# Patient Record
Sex: MALE | Race: WHITE | NOT HISPANIC OR LATINO | Employment: FULL TIME | URBAN - METROPOLITAN AREA
[De-identification: names, ages, dates, MRNs, and addresses within clinical notes are randomized per-mention and may not be internally consistent; named-entity substitution may affect disease eponyms.]

---

## 2017-11-02 ENCOUNTER — HOSPITAL ENCOUNTER (EMERGENCY)
Facility: CLINIC | Age: 32
Discharge: HOME | End: 2017-11-02
Attending: EMERGENCY MEDICINE | Admitting: EMERGENCY MEDICINE
Payer: COMMERCIAL

## 2017-11-02 ENCOUNTER — OFFICE VISIT (OUTPATIENT)
Dept: FAMILY MEDICINE | Facility: CLINIC | Age: 32
End: 2017-11-02
Payer: COMMERCIAL

## 2017-11-02 VITALS
SYSTOLIC BLOOD PRESSURE: 130 MMHG | OXYGEN SATURATION: 100 % | RESPIRATION RATE: 16 BRPM | HEART RATE: 80 BPM | TEMPERATURE: 97.3 F | DIASTOLIC BLOOD PRESSURE: 79 MMHG | HEIGHT: 72 IN | BODY MASS INDEX: 24.79 KG/M2 | WEIGHT: 183 LBS

## 2017-11-02 VITALS
TEMPERATURE: 98.6 F | HEIGHT: 72 IN | OXYGEN SATURATION: 99 % | WEIGHT: 183 LBS | BODY MASS INDEX: 24.79 KG/M2 | SYSTOLIC BLOOD PRESSURE: 110 MMHG | DIASTOLIC BLOOD PRESSURE: 72 MMHG | HEART RATE: 69 BPM

## 2017-11-02 DIAGNOSIS — Z23 NEED FOR TETANUS BOOSTER: ICD-10-CM

## 2017-11-02 DIAGNOSIS — L03.113 CELLULITIS OF HAND, RIGHT: ICD-10-CM
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
## 2017-11-02 DIAGNOSIS — S61.451A CAT BITE OF HAND, RIGHT, INITIAL ENCOUNTER: ICD-10-CM

## 2017-11-02 DIAGNOSIS — S61.451A CAT BITE OF HAND, RIGHT, INITIAL ENCOUNTER: Primary | ICD-10-CM
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
## 2017-11-02 PROCEDURE — 99284 EMERGENCY DEPT VISIT MOD MDM: CPT | Mod: 25

## 2017-11-02 PROCEDURE — 90714 TD VACC NO PRESV 7 YRS+ IM: CPT | Performed by: PHYSICIAN ASSISTANT

## 2017-11-02 PROCEDURE — 96365 THER/PROPH/DIAG IV INF INIT: CPT

## 2017-11-02 PROCEDURE — 90471 IMMUNIZATION ADMIN: CPT | Performed by: PHYSICIAN ASSISTANT

## 2017-11-02 PROCEDURE — 99214 OFFICE O/P EST MOD 30 MIN: CPT | Mod: 25 | Performed by: PHYSICIAN ASSISTANT

## 2017-11-02 PROCEDURE — 25000128 H RX IP 250 OP 636: Performed by: EMERGENCY MEDICINE

## 2017-11-02 RX ORDER — AMPICILLIN AND SULBACTAM 2; 1 G/1; G/1
3 INJECTION, POWDER, FOR SOLUTION INTRAMUSCULAR; INTRAVENOUS ONCE
Status: COMPLETED | OUTPATIENT
Start: 2017-11-02 | End: 2017-11-02

## 2017-11-02 RX ADMIN — AMPICILLIN SODIUM AND SULBACTAM SODIUM 3 G: 2; 1 INJECTION, POWDER, FOR SOLUTION INTRAMUSCULAR; INTRAVENOUS at 18:35

## 2017-11-02 ASSESSMENT — ENCOUNTER SYMPTOMS
WOUND: 1
JOINT SWELLING: 0
COLOR CHANGE: 1
FEVER: 0

## 2017-11-02 NOTE — ED PROVIDER NOTES
History     Chief Complaint:  Cat bite    HPI   Lazaro Ingram is a 31 year old male who presents for evaluation of a cat bite. The patient reports that two night ago he was petting a friend's cat when it bit him on his right hand. The area around the bite has since began to redden, which prompted him to seek evaluation. He was seen in clinic for this concern earlier today and was referred here to the ED. The cat that bit him is known to him and immunized for rabies. He notes that he does have a prescription for Augmentin called in, but that he has not picked this up or started taking it yet.    Allergies:  No Known Drug Allergies     Medications:    Augmentin    Past Medical History:    The patient denies any relevant past medical history.    Past Surgical History:    History reviewed. No pertinent past surgical history.    Family History:    The patient denies any relevant family medical history.    Social History:  Smoking Status: Yes  Alcohol Use: Yes  Marital Status:  Single     Review of Systems   Constitutional: Negative for fever.   Musculoskeletal: Negative for joint swelling.   Skin: Positive for color change (Reddened area around the wound) and wound (Cat bite puncute wounds to the right hand).   All other systems reviewed and are negative.    Physical Exam   Vitals:  Patient Vitals for the past 24 hrs:   BP Temp Temp src Pulse Resp SpO2 Height Weight   11/02/17 1744 130/79 97.3  F (36.3  C) Temporal 80 16 100 % 1.829 m (6') 83 kg (183 lb)     Physical Exam  General: Resting comfortably on the gurney  Head:  The scalp, face, and head appear normal  Eyes:  The pupils are normal    Conjunctivae and sclera appear normal  ENT:    The nose is normal    Ears/pinnae are normal  Neck:  Normal range of motion  MS:  Right hand:     There is a cat bite as noted below. There is one puncture on the dorsum into punctures on the volar aspect     The extensor tendon to the fifth finger is normal. The flexor  digitorum profundus and superficialis to the   fourth and fifth fingers are normal. There is no evidence of septic T no cerebritis. There is a small area of cat   bite cellulitis proximal to the dorsal bite as shown in the picture below.   Skin:  Cat bite cellulitis to the right hand as shown below   Neuro: Speech is normal and fluent  Psych:  Awake. Alert.  Normal affect.      Appropriate interactions                    Emergency Department Course     Interventions:  1835 Unasyn, 3 g, IV     Emergency Department Course:  Nursing notes and vitals reviewed.  1808 I had my initial encounter with the patient.  I performed an exam of the patient as documented above.     I discussed the treatment plan with the patient. They expressed understanding of this plan and consented to discharge. They will be discharged home with instructions for care and follow up. In addition, the patient will return to the emergency department if their symptoms persist, worsen, if new symptoms arise or if there is any concern.  All questions were answered.    Impression & Plan      Medical Decision Making:  This patient presents with a cat bite to the hand as noted above. There is 1 puncture wound dorsally and to involving the volar fifth web space. There is a small area of cellulitis dorsally. There is no flexor or extensor septic tenosynovitis.  There is no lymphangitic streaking. There are no systemic signs of infection. This will hopefully resolve with intravenous followed by oral antibiotics. Operative washout is not needed at this time.    Diagnosis:    ICD-10-CM    1. Cat bite, initial encounter W55.01XA    2. Cellulitis of hand, right L03.113    3. Cat bite of hand, right, initial encounter S61.451A DISCONTINUED: amoxicillin-clavulanate (AUGMENTIN) 875-125 MG per tablet    W55.01XA      Disposition:   Discharge    Scribe Disclosure:  Aldo RAM, am serving as a scribe at 6:09 PM on 11/2/2017 to document services personally  performed by Blaine Rios MD, based on my observations and the provider's statements to me.    11/2/2017    EMERGENCY DEPARTMENT       Blaine Rios MD  11/02/17 1029

## 2017-11-02 NOTE — ED AVS SNAPSHOT
Emergency Department    [address] [place] [id number]    Phone:  [phone]    Fax:  [phone]                                       Lazaro Ingram   MRN: [id number]    Department:   Emergency Department   Date of Visit:  11/2/2017           After Visit Summary Signature Page     I have received my discharge instructions, and my questions have been answered. I have discussed any challenges I see with this plan with the nurse or doctor.    ..........................................................................................................................................  Patient/Patient Representative Signature      ..........................................................................................................................................  Patient Representative Print Name and Relationship to Patient    ..................................................               ................................................  Date                                            Time    ..........................................................................................................................................  Reviewed by Signature/Title    ...................................................              ..............................................  Date                                                            Time

## 2017-11-02 NOTE — PROGRESS NOTES
SUBJECTIVE:   Lazaro Ingram is a 31 year old male presenting with a chief complaint of cat bite to his right dominant hand on the small finger.  It is not over the joint  It is on the dorsum and the palmar side.  Onset of symptoms was 2 day(s) ago.  Course of illness is worsening.    Severity mild  Current and Associated symptoms: DENIES fever, chills, sweats and fatigue  He has had redness and swelling of the small finger  Treatment measures tried include None tried.  Predisposing factors include tetanus is unable to be recalled  He states the cat is his friend's and knows that immunizations to included rabies are up to date.    No past medical history on file.  Current Outpatient Prescriptions   Medication Sig Dispense Refill     amoxicillin-clavulanate (AUGMENTIN) 875-125 MG per tablet Take 1 tablet by mouth 2 times daily 20 tablet 0     Social History   Substance Use Topics     Smoking status: Not on file     Smokeless tobacco: Not on file     Alcohol use Not on file       ROS:  Review of systems negative except as stated above.    OBJECTIVE:  /72  Pulse 69  Temp 98.6  F (37  C) (Oral)  Ht 6' (1.829 m)  Wt 183 lb (83 kg)  SpO2 99%  BMI 24.82 kg/m2  GENERAL APPEARANCE: healthy, alert and no distress  EYES: EOMI,  PERRL, conjunctiva clear  NECK: supple, nontender, no lymphadenopathy  RESP: lungs clear to auscultation - no rales, rhonchi or wheezes  CV: regular rates and rhythm, normal S1 S2, no murmur noted  NEURO: Normal strength and tone, sensory exam grossly normal,  normal speech and mentation  SKIN: no suspicious lesions or rashes  Right hand: small finger with puncture wounds consistent with cat bite over the proximal phalanx on dorsum and palmar aspect of small finger.  Erythema, edema of proximal phalanx. Not over MCP or PIP joints.  Extensor tendon with mild pain with passive flexion.  Pain to palpation over palmar tendon.    ASSESSMENT:  Cat bite to small finger of right hand  (2  days old at time of presentation).  Cellulitis of right hand  Tetanus deficiency      PLAN:  Patient was given tetanus  Will treat with oral antibiotic. Script for Augmentin written to be used after ER visit.  Will send to the ER for definitive evaluation and treatment and assessment for ortho consult.  He may need IV antibiotics. I am concerned for tenosynovitis secondary to cat bite.  I called and gave report to [facility] ER.    Cat bite of hand, right, initial encounter  - TD (ADULT, 7+) PRESERVE FREE    Need for tetanus booster  - TD (ADULT, 7+) PRESERVE FREE

## 2017-11-02 NOTE — DISCHARGE INSTRUCTIONS
Cat Bite    A cat bite can cause a wound deep enough to break the skin. In such cases, the wound is cleaned and then sometimes closed. If the wound is closed it is usually not closed completely. This is so that fluid can drain if the wound becomes infected. Often the wound is left open to heal. In addition to wound care, a tetanus shot may be given, if needed.  Home care    Wash your hands well with soap and warm water before and after caring for the wound. This helps lower the risk of infection.    Care for the wound as directed. If a dressing was applied to the wound, be sure to change it as directed.    If the wound bleeds, place a clean, soft cloth on the wound. Then firmly apply pressure until the bleeding stops. This may take up to 5 minutes. Do not release the pressure and look at the wound during this time.    Always get medical attention for cat bites on the hand. They are highly likely to become infected.    Most wounds heal within 10 days. But an infection can occur even with proper treatment. So be sure to check the wound daily for signs of infection (see below).    Antibiotics may be prescribed. These help prevent or treat infection. If you re given antibiotics, take them as directed. Also be sure to complete the medicines.  Rabies prevention  Rabies is a virus that can be carried in certain animals. These can include domestic animals such as cats and dogs. Pets fully vaccinated against rabies (2 shots) are at very low risk of infection. But because human rabies is almost always fatal, any biting pet should be confined for 10 days as an extra precaution. In general, if there is a risk for rabies, the following steps may need to be taken:    If someone s pet cat has bitten you, it should be kept in a secure area for the next 10 days to watch for signs of illness. (If the pet owner won t allow this, contact your local animal control center.) If the cat becomes ill or dies during that time, contact your  local animal control center at once so the animal may be tested for rabies. If the cat stays healthy for the next 10 days, there is no danger of rabies in the animal or you.    If a stray cat bit you, contact your local animal control center. They can give information on capture, quarantine, and animal rabies testing.    If you can t find the animal that bit you in the next 2 days, and if rabies exists in your area, you may need to receive the rabies vaccine series. Call your healthcare provider right away. Or return to the emergency department promptly.    All animal bites should be reported to the local animal control center. If you were not given a form to fill out, you can report this yourself.  Follow-up care  Follow up with your healthcare provider, or as directed.  When to seek medical advice  Call your healthcare provider right away if any of these occur:    Signs of infection:    Spreading redness or warmth from the wound    Increased pain or swelling    Fever of 100.4 F (38 C) or higher, or as directed by your healthcare provider    Colored fluid or pus draining from the wound    Enlarged lymph nodes above the area that was bitten, such as lymph nodes in the armpit if you were bitten on the hand or arm. This may be a sign of cat-scratch disease (cat-scratch fever).    Signs of rabies infection:    Headache    Confusion    Strange behavior    Increased salivating or drooling    Seizure    Decreased ability to move any body part near the bite area    Bleeding that can't be stopped after 5 minutes of firm pressure  Date Last Reviewed: 3/23/2015    6543-0349 [employer]. [address] [place] [address], [place] [id number]. All rights reserved. This information is not intended as a substitute for professional medical care. Always follow your healthcare professional's instructions.          Discharge Instructions for Cellulitis  You have been diagnosed with cellulitis. This is an infection in the deepest  layer of the skin. In some cases, the infection also affects the muscle. Cellulitis is caused by bacteria. The bacteria can enter the body through broken skin. This can happen with a cut, scratch, animal bite, or an insect bite that has been scratched. You may have been treated in the hospital with antibiotics and fluids. You will likely be given a prescription for antibiotics to take at home. This sheet will help you take care of yourself at home.  Home care  When you are home:    Take the prescribed antibiotic medicine you are given as directed until it is gone. Take it even if you feel better. It treats the infection and stops it from returning. Not taking all the medicine can make future infections hard to treat.    Keep the infected area clean.    When possible, raise the infected area above the level of your heart. This helps keep swelling down.    Talk with your healthcare provider if you are in pain. Ask what kind of over-the-counter medicine you can take for pain.    Apply clean bandages as advised.    Take your temperature once a day for a week.    Wash your hands often to prevent spreading the infection.  In the future, wash your hands before and after you touch cuts, scratches, or bandages. This will help prevent infection.   When to call your healthcare provider  Call your healthcare provider immediately if you have any of the following:    Difficulty or pain when moving the joints above or below the infected area    Discharge or pus draining from the area    Fever of 100.4 F (38 C) or higher, or as directed by your healthcare provider    Pain that gets worse in or around the infected     Redness that gets worse in or around the infected area, particularly if the area of redness expands to a wider area    Shaking chills    Swelling of the infected area    Vomiting   Date Last Reviewed: 8/1/2016 2000-2017 [employer]. [address] [place] [address], [place] [address]. All rights reserved. This  information is not intended as a substitute for professional medical care. Always follow your healthcare professional's instructions.      Discharge Instructions  Cellulitis    Cellulitis is an infection of the skin that occurs when bacteria enter the skin.   Symptoms are generally redness, swelling, warmth and pain.  Your infection appeared to be appropriate to treat at home with antibiotics.  However, sometimes your infection may be worse than it seemed at first, or may worsen with time. If you have new or worse symptoms, you may need to be seen again in the Emergency Department or by your primary provider.    Generally, every Emergency Department visit should have a follow-up clinic visit with either a primary or a specialty clinic/provider. Please follow-up as instructed by your emergency provider today.    Return to the Emergency Department if:    The redness, pain, or swelling gets a lot worse.  If the red area was marked, return if it is red significantly beyond the marked area.    You are unable to get your antibiotics, or are vomiting (throwing up) these pills, or you cannot take them.    You are feeling more ill, weak or lightheaded.    You start to run a new fever (temperature >101 F).    Anything else about the infection worries or concerns you.  Treatment:    Start your antibiotics right away, and take them as prescribed. Be sure to finish the whole prescription, even if you are better.    Apply a heating pad, warm packs, or warm water soaks to the infected area for 15 minutes at a time, at least 3 times a day. Do not use a heating pad on your feet or legs if you have diabetes. Do not sleep with a heating pad on, since this can cause burns or skin injury.    Rest your injured area for at least 1-2 days. After that you may start using your extremity again as long as there is not too much pain.     Raise the injured area above the level of your heart as much as possible in the first 1-2 days.    Tylenol   (acetaminophen), Motrin  (ibuprofen), or Advil  (ibuprofen) may help may help reduce pain and fever and may help you feel more comfortable. Be sure to read and follow the package directions, and ask your provider if you have questions.    If you were given a prescription for medicine here today, be sure to read all of the information (including the package insert) that comes with your prescription.  This will include important information about the medicine, its side effects, and any warnings that you need to know about.  The pharmacist who fills the prescription can provide more information and answer questions you may have about the medicine.  If you have questions or concerns that the pharmacist cannot address, please call or return to the Emergency Department.     Remember that you can always come back to the Emergency Department if you are not able to see your regular provider in the amount of time listed above, if you get any new symptoms, or if there is anything that worries you.        Monitor your hand infection very carefully.  If the redness is worsening beyond the line drawn return to the emergency department tomorrow or follow-up with hand surgery.  Sleep with your hand and arm above the level of your heart tonight.

## 2017-11-02 NOTE — MR AVS SNAPSHOT
"              After Visit Summary   2017    Lazaro Ingram    MRN: [id number]           Patient Information     Date Of Birth          1985        Visit Information        Provider Department      2017 4:00 PM Ken Vale PA-C [facility]        Today's Diagnoses     Cat bite of hand, right, initial encounter    -  1    Need for tetanus booster           Follow-ups after your visit        Who to contact     If you have questions or need follow up information about today's clinic visit or your schedule please contact [facility] directly at [phone].  Normal or non-critical lab and imaging results will be communicated to you by [employer], letter or phone within 4 business days after the clinic has received the results. If you do not hear from us within 7 days, please contact the clinic through [employer] or phone. If you have a critical or abnormal lab result, we will notify you by phone as soon as possible.  Submit refill requests through [employer] or call your pharmacy and they will forward the refill request to us. Please allow 3 business days for your refill to be completed.          Additional Information About Your Visit        MyChart Information     [employer] lets you send messages to your doctor, view your test results, renew your prescriptions, schedule appointments and more. To sign up, go to [email or web address] . Click on \"Log in\" on the left side of the screen, which will take you to the Welcome page. Then click on \"Sign up Now\" on the right side of the page.     You will be asked to enter the access code listed below, as well as some personal information. Please follow the directions to create your username and password.     Your access code is: [email or web address]  Expires: 2018  7:40 PM     Your access code will  in 90 days. If you need help or a new code, please call your [facility] or [phone].        Care EveryWhere ID  "    This is your Care EveryWhere ID. This could be used by other organizations to access your [facility] medical records  [id number]        Your Vitals Were     Pulse Temperature Height Pulse Oximetry BMI (Body Mass Index)       69 98.6  F (37  C) (Oral) 6' (1.829 m) 99% 24.82 kg/m2        Blood Pressure from Last 3 Encounters:   11/02/17 130/79   11/02/17 110/72    Weight from Last 3 Encounters:   11/02/17 183 lb (83 kg)   11/02/17 183 lb (83 kg)              We Performed the Following     TD (ADULT, 7+) PRESERVE FREE        Primary Care Provider    Physician No Ref-Primary       NO REF-PRIMARY PHYSICIAN        Equal Access to Services     JAYESH LIVINGSTON : Fred Medina, benedicto torres, cathleen bonilla, chanel ortiz . So [facility] [phone].    ATENCIÓN: Si habla español, tiene a wills disposición servicios gratuitos de asistencia lingüística. Llame al [phone].    We comply with applicable federal civil rights laws and [place] laws. We do not discriminate on the basis of race, color, national origin, age, disability, sex, sexual orientation, or gender identity.            Thank you!     Thank you for choosing [facility]  for your care. Our goal is always to provide you with excellent care. Hearing back from our patients is one way we can continue to improve our services. Please take a few minutes to complete the written survey that you may receive in the mail after your visit with us. Thank you!             Your Updated Medication List - Protect others around you: Learn how to safely use, store and throw away your medicines at www.disposemymeds.org.      Notice  As of 11/2/2017  9:01 PM    You have not been prescribed any medications.

## 2017-11-02 NOTE — ED AVS SNAPSHOT
Emergency Department    [address] [facility] [address]    Phone:  [phone]    Fax:  [phone]                                       Lazaro Ingram   MRN: [id number]    Department:   Emergency Department   Date of Visit:  11/2/2017           Patient Information     Date Of Birth          1985        Your diagnoses for this visit were:     Cat bite, initial encounter     Cellulitis of hand, right     Cat bite of hand, right, initial encounter        You were seen by Blaine Rios MD.      Follow-up Information     Follow up with Matt Salinas MD.    Specialty:  Orthopedics    Why:  As needed, If symptoms worsen    Contact information:    [facility]  [address] [id number] [phone]          Follow up with  Emergency Department.    Specialty:  EMERGENCY MEDICINE    Why:  As needed, If symptoms worsen    Contact information:    [address] [facility] [address] [phone]        Discharge Instructions         Cat Bite    A cat bite can cause a wound deep enough to break the skin. In such cases, the wound is cleaned and then sometimes closed. If the wound is closed it is usually not closed completely. This is so that fluid can drain if the wound becomes infected. Often the wound is left open to heal. In addition to wound care, a tetanus shot may be given, if needed.  Home care    Wash your hands well with soap and warm water before and after caring for the wound. This helps lower the risk of infection.    Care for the wound as directed. If a dressing was applied to the wound, be sure to change it as directed.    If the wound bleeds, place a clean, soft cloth on the wound. Then firmly apply pressure until the bleeding stops. This may take up to 5 minutes. Do not release the pressure and look at the wound during this time.    Always get medical attention for cat bites on the hand. They are highly likely to become  infected.    Most wounds heal within 10 days. But an infection can occur even with proper treatment. So be sure to check the wound daily for signs of infection (see below).    Antibiotics may be prescribed. These help prevent or treat infection. If you re given antibiotics, take them as directed. Also be sure to complete the medicines.  Rabies prevention  Rabies is a virus that can be carried in certain animals. These can include domestic animals such as cats and dogs. Pets fully vaccinated against rabies (2 shots) are at very low risk of infection. But because human rabies is almost always fatal, any biting pet should be confined for 10 days as an extra precaution. In general, if there is a risk for rabies, the following steps may need to be taken:    If someone s pet cat has bitten you, it should be kept in a secure area for the next 10 days to watch for signs of illness. (If the pet owner won t allow this, contact your local animal control center.) If the cat becomes ill or dies during that time, contact your local animal control center at once so the animal may be tested for rabies. If the cat stays healthy for the next 10 days, there is no danger of rabies in the animal or you.    If a stray cat bit you, contact your local animal control center. They can give information on capture, quarantine, and animal rabies testing.    If you can t find the animal that bit you in the next 2 days, and if rabies exists in your area, you may need to receive the rabies vaccine series. Call your healthcare provider right away. Or return to the emergency department promptly.    All animal bites should be reported to the local animal control center. If you were not given a form to fill out, you can report this yourself.  Follow-up care  Follow up with your healthcare provider, or as directed.  When to seek medical advice  Call your healthcare provider right away if any of these occur:    Signs of infection:    Spreading redness  or warmth from the wound    Increased pain or swelling    Fever of 100.4 F (38 C) or higher, or as directed by your healthcare provider    Colored fluid or pus draining from the wound    Enlarged lymph nodes above the area that was bitten, such as lymph nodes in the armpit if you were bitten on the hand or arm. This may be a sign of cat-scratch disease (cat-scratch fever).    Signs of rabies infection:    Headache    Confusion    Strange behavior    Increased salivating or drooling    Seizure    Decreased ability to move any body part near the bite area    Bleeding that can't be stopped after 5 minutes of firm pressure  Date Last Reviewed: 3/23/2015    1560-9539 [employer]. [address] [place] [address], [place] [address]. All rights reserved. This information is not intended as a substitute for professional medical care. Always follow your healthcare professional's instructions.          Discharge Instructions for Cellulitis  You have been diagnosed with cellulitis. This is an infection in the deepest layer of the skin. In some cases, the infection also affects the muscle. Cellulitis is caused by bacteria. The bacteria can enter the body through broken skin. This can happen with a cut, scratch, animal bite, or an insect bite that has been scratched. You may have been treated in the hospital with antibiotics and fluids. You will likely be given a prescription for antibiotics to take at home. This sheet will help you take care of yourself at home.  Home care  When you are home:    Take the prescribed antibiotic medicine you are given as directed until it is gone. Take it even if you feel better. It treats the infection and stops it from returning. Not taking all the medicine can make future infections hard to treat.    Keep the infected area clean.    When possible, raise the infected area above the level of your heart. This helps keep swelling down.    Talk with your healthcare provider if you are in pain.  Ask what kind of over-the-counter medicine you can take for pain.    Apply clean bandages as advised.    Take your temperature once a day for a week.    Wash your hands often to prevent spreading the infection.  In the future, wash your hands before and after you touch cuts, scratches, or bandages. This will help prevent infection.   When to call your healthcare provider  Call your healthcare provider immediately if you have any of the following:    Difficulty or pain when moving the joints above or below the infected area    Discharge or pus draining from the area    Fever of 100.4 F (38 C) or higher, or as directed by your healthcare provider    Pain that gets worse in or around the infected     Redness that gets worse in or around the infected area, particularly if the area of redness expands to a wider area    Shaking chills    Swelling of the infected area    Vomiting   Date Last Reviewed: 8/1/2016 2000-2017 [employer]. [address] [place] [address]. All rights reserved. This information is not intended as a substitute for professional medical care. Always follow your healthcare professional's instructions.      Discharge Instructions  Cellulitis    Cellulitis is an infection of the skin that occurs when bacteria enter the skin.   Symptoms are generally redness, swelling, warmth and pain.  Your infection appeared to be appropriate to treat at home with antibiotics.  However, sometimes your infection may be worse than it seemed at first, or may worsen with time. If you have new or worse symptoms, you may need to be seen again in the Emergency Department or by your primary provider.    Generally, every Emergency Department visit should have a follow-up clinic visit with either a primary or a specialty clinic/provider. Please follow-up as instructed by your emergency provider today.    Return to the Emergency Department if:    The redness, pain, or swelling gets a lot worse.  If the  red area was marked, return if it is red significantly beyond the marked area.    You are unable to get your antibiotics, or are vomiting (throwing up) these pills, or you cannot take them.    You are feeling more ill, weak or lightheaded.    You start to run a new fever (temperature >101 F).    Anything else about the infection worries or concerns you.  Treatment:    Start your antibiotics right away, and take them as prescribed. Be sure to finish the whole prescription, even if you are better.    Apply a heating pad, warm packs, or warm water soaks to the infected area for 15 minutes at a time, at least 3 times a day. Do not use a heating pad on your feet or legs if you have diabetes. Do not sleep with a heating pad on, since this can cause burns or skin injury.    Rest your injured area for at least 1-2 days. After that you may start using your extremity again as long as there is not too much pain.     Raise the injured area above the level of your heart as much as possible in the first 1-2 days.    Tylenol  (acetaminophen), Motrin  (ibuprofen), or Advil  (ibuprofen) may help may help reduce pain and fever and may help you feel more comfortable. Be sure to read and follow the package directions, and ask your provider if you have questions.    If you were given a prescription for medicine here today, be sure to read all of the information (including the package insert) that comes with your prescription.  This will include important information about the medicine, its side effects, and any warnings that you need to know about.  The pharmacist who fills the prescription can provide more information and answer questions you may have about the medicine.  If you have questions or concerns that the pharmacist cannot address, please call or return to the Emergency Department.     Remember that you can always come back to the Emergency Department if you are not able to see your regular provider in the amount of time  listed above, if you get any new symptoms, or if there is anything that worries you.        Monitor your hand infection very carefully.  If the redness is worsening beyond the line drawn return to the emergency department tomorrow or follow-up with hand surgery.  Sleep with your hand and arm above the level of your heart tonight.            24 Hour Appointment Hotline       To make an appointment at any [facility], call [phone] ([phone]). If you don't have a family doctor or clinic, we will help you find one. [facility] clinics are conveniently located to serve the needs of you and your family.             Review of your medicines      STOP taking        Dose Reason for stopping Comments    amoxicillin-clavulanate 875-125 MG per tablet   Commonly known as:  AUGMENTIN                      Orders Needing Specimen Collection     None      Pending Results     No orders found from 10/31/2017 to 11/3/2017.            Pending Culture Results     No orders found from 10/31/2017 to 11/3/2017.            Pending Results Instructions     If you had any lab results that were not finalized at the time of your Discharge, you can call the ED Lab Result RN at [phone]. You will be contacted by this team for any positive Lab results or changes in treatment. The nurses are available 7 days a week from 10A to 6:30P.  You can leave a message 24 hours per day and they will return your call.        Test Results From Your Hospital Stay               Clinical Quality Measure: Blood Pressure Screening     Your blood pressure was checked while you were in the emergency department today. The last reading we obtained was  BP: 130/79 . Please read the guidelines below about what these numbers mean and what you should do about them.  If your systolic blood pressure (the top number) is less than 120 and your diastolic blood pressure (the bottom number) is less than 80, then your blood pressure is normal. There is nothing more  "that you need to do about it.  If your systolic blood pressure (the top number) is 120-139 or your diastolic blood pressure (the bottom number) is 80-89, your blood pressure may be higher than it should be. You should have your blood pressure rechecked within a year by a primary care provider.  If your systolic blood pressure (the top number) is 140 or greater or your diastolic blood pressure (the bottom number) is 90 or greater, you may have high blood pressure. High blood pressure is treatable, but if left untreated over time it can put you at risk for heart attack, stroke, or kidney failure. You should have your blood pressure rechecked by a primary care provider within the next 4 weeks.  If your provider in the emergency department today gave you specific instructions to follow-up with your doctor or provider even sooner than that, you should follow that instruction and not wait for up to 4 weeks for your follow-up visit.        Thank you for choosing [facility]       Thank you for choosing [facility] for your care. Our goal is always to provide you with excellent care. Hearing back from our patients is one way we can continue to improve our services. Please take a few minutes to complete the written survey that you may receive in the mail after you visit with us. Thank you!        [employer] Information     [employer] lets you send messages to your doctor, view your test results, renew your prescriptions, schedule appointments and more. To sign up, go to [email or web address] . Click on \"Log in\" on the left side of the screen, which will take you to the Welcome page. Then click on \"Sign up Now\" on the right side of the page.     You will be asked to enter the access code listed below, as well as some personal information. Please follow the directions to create your username and password.     Your access code is: [email or web address]  Expires: 2018  7:40 PM     Your access code will  in 90 days. If you need help or a " new code, please call your [place] clinic or [phone].        Care EveryWhere ID     This is your Care EveryWhere ID. This could be used by other organizations to access your [place] medical records  [id number]        Equal Access to Services     JAYESH LIVINGSTON : Fred sorensen Solaney, wagrahamda luqadaha, qaybta kaalmada irene, chanel multani. So [facility] [phone].    ATENCIÓN: Si habla español, tiene a wills disposición servicios gratuitos de asistencia lingüística. Llame al [phone].    We comply with applicable federal civil rights laws and [place] laws. We do not discriminate on the basis of race, color, national origin, age, disability, sex, sexual orientation, or gender identity.            After Visit Summary       This is your record. Keep this with you and show to your community pharmacist(s) and doctor(s) at your next visit.

## 2017-11-02 NOTE — NURSING NOTE
Chief Complaint   Patient presents with     Trauma     Pt in clinic to have eval for right hand cat bite.       Initial /72  Pulse 69  Temp 98.6  F (37  C) (Oral)  Ht 1.829 m (6')  Wt 83 kg (183 lb)  SpO2 99%  BMI 24.82 kg/m2 Estimated body mass index is 24.82 kg/(m^2) as calculated from the following:    Height as of this encounter: 1.829 m (6').    Weight as of this encounter: 83 kg (183 lb).  Medication Reconciliation: complete   Katelyn Thomas/ MA

## 2019-11-04 ENCOUNTER — OFFICE VISIT (OUTPATIENT)
Dept: FAMILY MEDICINE | Facility: CLINIC | Age: 34
End: 2019-11-04
Payer: COMMERCIAL

## 2019-11-04 VITALS
TEMPERATURE: 98.1 F | DIASTOLIC BLOOD PRESSURE: 70 MMHG | BODY MASS INDEX: 24.08 KG/M2 | WEIGHT: 172 LBS | RESPIRATION RATE: 18 BRPM | HEIGHT: 71 IN | SYSTOLIC BLOOD PRESSURE: 104 MMHG | OXYGEN SATURATION: 97 % | HEART RATE: 84 BPM

## 2019-11-04 DIAGNOSIS — R07.89 XYPHOIDALGIA: ICD-10-CM

## 2019-11-04 DIAGNOSIS — F32.A ANXIETY AND DEPRESSION: Primary | ICD-10-CM

## 2019-11-04 DIAGNOSIS — L30.9 ECZEMA, UNSPECIFIED TYPE: ICD-10-CM

## 2019-11-04 DIAGNOSIS — F41.9 ANXIETY AND DEPRESSION: Primary | ICD-10-CM

## 2019-11-04 DIAGNOSIS — K62.9 RECTAL LESION: ICD-10-CM

## 2019-11-04 PROCEDURE — 99214 OFFICE O/P EST MOD 30 MIN: CPT | Performed by: PHYSICIAN ASSISTANT

## 2019-11-04 RX ORDER — TRIAMCINOLONE ACETONIDE 1 MG/G
CREAM TOPICAL 2 TIMES DAILY
Qty: 45 G | Refills: 1 | Status: SHIPPED | OUTPATIENT
Start: 2019-11-04 | End: 2021-11-18

## 2019-11-04 ASSESSMENT — ANXIETY QUESTIONNAIRES
IF YOU CHECKED OFF ANY PROBLEMS ON THIS QUESTIONNAIRE, HOW DIFFICULT HAVE THESE PROBLEMS MADE IT FOR YOU TO DO YOUR WORK, TAKE CARE OF THINGS AT HOME, OR GET ALONG WITH OTHER PEOPLE: EXTREMELY DIFFICULT
3. WORRYING TOO MUCH ABOUT DIFFERENT THINGS: NEARLY EVERY DAY
5. BEING SO RESTLESS THAT IT IS HARD TO SIT STILL: MORE THAN HALF THE DAYS
2. NOT BEING ABLE TO STOP OR CONTROL WORRYING: NEARLY EVERY DAY
1. FEELING NERVOUS, ANXIOUS, OR ON EDGE: NEARLY EVERY DAY
7. FEELING AFRAID AS IF SOMETHING AWFUL MIGHT HAPPEN: SEVERAL DAYS
6. BECOMING EASILY ANNOYED OR IRRITABLE: NEARLY EVERY DAY
GAD7 TOTAL SCORE: 18

## 2019-11-04 ASSESSMENT — PATIENT HEALTH QUESTIONNAIRE - PHQ9
SUM OF ALL RESPONSES TO PHQ QUESTIONS 1-9: 23
5. POOR APPETITE OR OVEREATING: NEARLY EVERY DAY

## 2019-11-04 ASSESSMENT — MIFFLIN-ST. JEOR: SCORE: 1756.28

## 2019-11-04 NOTE — PATIENT INSTRUCTIONS
Patient Education     Constipation (Adult)  Constipation means that you have bowel movements that are less frequent than usual. Stools often become very hard and difficult to pass.  Constipation is very common. At some point in life, it affects almost everyone. Since everyone's bowel habits are different, what is constipation to one person may not be to another. Your healthcare provider may do tests to diagnose constipation. It depends on what he or she finds when evaluating you.    Symptoms of constipation include:    Abdominal pain    Bloating    Vomiting    Painful bowel movements    Itching, swelling, bleeding, or pain around the anus  Causes  Constipation can have many causes. These include:    Diet low in fiber    Too much dairy    Not drinking enough liquids    Lack of exercise or physical activity (especially true for older adults)    Changes in lifestyle or daily routine, including pregnancy, aging, work, and travel    Frequent use or misuse of laxatives    Ignoring the urge to have a bowel movement or delaying it until later    Medicines, such as certain prescription pain medicines, iron supplements, antacids, certain antidepressants, and calcium supplements    Diseases like irritable bowel syndrome, bowel obstructions, stroke, diabetes, thyroid disease, Parkinson disease, hemorrhoids, and colon cancer  Complications  Potential complications of constipation can include:    Hemorrhoids    Rectal bleeding from hemorrhoids or anal fissures (skin tears)    Hernias    Dependency on laxatives    Chronic constipation    Fecal impaction, a severe form of constipation in which a large amount of hard stool is in your rectum that you can't pass    Bowel obstruction or perforation  Home care  All treatment should be done after talking with your healthcare provider. This is especially true if you have another medical problems, are taking prescription medicines, or are an older adult. Treatment most often involves  lifestyle changes. You may also need medicines. Your healthcare provider will tell you which will work best for you. Follow the advice below to help avoid this problem in the future.  Lifestyle changes  These lifestyle changes can help prevent constipation:    Diet. Eat a high-fiber diet, with fresh fruit and vegetables, and reduce dairy intake, meats, and processed foods    Fluids. It's important to get enough fluids each day. Drink plenty of water when you eat more fiber. If you are on diet that limits the amount of fluid you can have, talk about this with your healthcare provider.    Regular exercise. Check with your healthcare provider first.  Medicines  Take any medicines as directed. Some laxatives are safe to use only every now and then. Others can be taken on a regular basis. While laxatives don't cause bowel dependence, they are treating the symptoms. So your constipation may return if you don't make other changes. Talk with your healthcare provider or pharmacist if you have questions.  Prescription pain medicines can cause constipation. If you are taking this kind of medicine, ask your healthcare provider if you should also take a stool softener.  Medicines you may take to treat constipation include:    Fiber supplements    Stool softeners    Laxatives    Enemas    Rectal suppositories  Follow-up care  Follow up with your healthcare provider if symptoms don't get better in the next few days. You may need to have more tests or see a specialist.  Call 911  Call 911 if any of these occur:    Trouble breathing    Stiff, rigid abdomen that is severely painful to touch    Confusion    Fainting or loss of consciousness    Rapid heart rate    Chest pain  When to seek medical advice  Call your healthcare provider right away if any of these occur:    Fever of 100.4 F (38 C) or higher, or as directed by your healthcare provider    Failure to resume normal bowel movements    Pain in your abdomen or back gets  worse    Nausea or vomiting    Swelling in your abdomen    Blood in the stool    Black, tarry stool    Involuntary weight loss    Weakness  Date Last Reviewed: 6/1/2018 2000-2018 [employer] [place]. [address] [place] [address], [place] [address]. All rights reserved. This information is not intended as a substitute for professional medical care. Always follow your healthcare professional's instructions.

## 2019-11-04 NOTE — PROGRESS NOTES
Subjective     Lazaro Ingram is a 33 year old male who presents to clinic today for the following health issues:    HPI     33 year old male presenting to establish care and discuss recent mental health issues. He reports approximately 1 week ago he had a mental breakdown at work. 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 Reports stress level has been elevated, and he lost his cool/had an outburst in the back of the store, not in public. He is currently on leave from work and unable to return until seen by a provider. Overall he feels like work has been supportive. They are helping him find counseling services. He reports he has been dealing with increased stress for the past 1.5 years. His girlfriend of 6 years left him abruptly which was a major stressor. Leading up to his breakdown at work he had been feeling increasingly depressed, not wanting to get out of bed to go to work in the morning. He feels anxious. He notes a lack of mindfulness, not feeling present, not focusing or remembering things like he should. His sleep has been inconsistent, some nights he will sleep for 3 hours other nights for 10 hours. For the last year he has tried to increase activity level but reports he has not been motivated lately. He generally enjoys working out outside. In winter has gone to the gym in the past. He denies SI.    He admits a history of depression when younger approximately 16 years of age. He tried medication but did not like the way they made him feel. Does not recall name of medication(s).     He also has some additional concerns today. He has noticed a prominence over the middle of his rib cage. He notices tenderness to the area sometimes, primarily when he bends forward. He would like this evaluated today.    He has also noticed there is a small tear near his rectum that he reports is due to poor dietary habits. He noticed some drainage from the area a few days ago and now it is  "improved. Would like it checked out to ensure it is not infected. It is still mildly tender if he presses on the area.     He also has a history of eczema. With recent increase in stress he has noticed a flare of symptoms to his legs. He has been scratching the lesions. Would like a medication to help with this today.         There is no problem list on file for this patient.    History reviewed. No pertinent surgical history.    Social History     Tobacco Use     Smoking status: Former Smoker     Packs/day: 0.50     Smokeless tobacco: Current User   Substance Use Topics     Alcohol use: Yes     History reviewed. No pertinent family history.      No current outpatient medications on file.     No Known Allergies    Reviewed and updated as needed this visit by Provider         Review of Systems   ROS COMP: Constitutional, HEENT, cardiovascular, pulmonary, gi and gu systems are negative, except as otherwise noted.      Objective    /70 (BP Location: Left arm, Patient Position: Sitting, Cuff Size: Adult Regular)   Pulse 84   Temp 98.1  F (36.7  C) (Oral)   Resp 18   Ht 1.81 m (5' 11.25\")   Wt 78 kg (172 lb)   SpO2 97%   BMI 23.82 kg/m    Body mass index is 23.82 kg/m .  Physical Exam  Vitals signs and nursing note reviewed.   Constitutional:       Appearance: Normal appearance. He is normal weight.   HENT:      Head: Normocephalic and atraumatic.      Right Ear: Tympanic membrane, ear canal and external ear normal.      Left Ear: Tympanic membrane, ear canal and external ear normal.      Mouth/Throat:      Mouth: Mucous membranes are moist.      Pharynx: No posterior oropharyngeal erythema.   Eyes:      Extraocular Movements: Extraocular movements intact.      Conjunctiva/sclera: Conjunctivae normal.   Cardiovascular:      Rate and Rhythm: Normal rate and regular rhythm.      Heart sounds: Normal heart sounds.   Pulmonary:      Effort: Pulmonary effort is normal.      Breath sounds: Normal breath sounds. "   Chest:      Comments: Prominent xyphoid, no chest wall tenderness   Abdominal:      General: Abdomen is flat. Bowel sounds are normal. There is no distension.      Palpations: Abdomen is soft.      Tenderness: There is no tenderness. There is no right CVA tenderness, left CVA tenderness, guarding or rebound.   Genitourinary:     Rectum: No tenderness, anal fissure or external hemorrhoid.      Comments: There is a small pinpoint area of tenderness over the 3 o'clock position, no obvious hemorrhoid. No redness or swelling. No induration.   Musculoskeletal: Normal range of motion.   Skin:     General: Skin is warm and dry.      Comments: There are erythematous scaly lesions to bilateral legs with evidence of excoriations    Neurological:      General: No focal deficit present.      Mental Status: He is alert and oriented to person, place, and time.   Psychiatric:         Mood and Affect: Mood normal.         Behavior: Behavior normal.          Diagnostic Test Results:  Labs reviewed in Epic  No results found for any visits on 11/04/19.        Assessment & Plan     1. Anxiety and depression  - Discussed starting patient on daily anxiety/depression medication but he is not interested at this time  - Patient thinks he will obtain counseling services through work but was open to [place] referral today in the event he cannot find a therapist   - Recommended patient focus on diet/exercise  - Discussed biofeedback applications for stress reduction  - MENTAL HEALTH REFERRAL  - Adult; Outpatient Treatment; Individual/Couples/Family/Group Therapy/Health Psychology; [facility] [place] [facility] [phone]; We will contact you to schedule the appointment or please call with any questions    2. Rectal lesion  - Pinpoint lesion on exam today, minimally tender  - Encouraged patient to drink plenty of water, exercise, eat fruits/vegetables, avoid straining on toilet for extended periods of time   - Handout provided on  constipation    3. Eczema, unspecified type  - triamcinolone (KENALOG) 0.1 % external cream; Apply topically 2 times daily  Dispense: 45 g; Refill: 1    4. Xyphoidalgia  - PRN tylenol/ibuprofen, ice  - Return to care if new or worsening symptoms            Patient Instructions     Patient Education     Constipation (Adult)  Constipation means that you have bowel movements that are less frequent than usual. Stools often become very hard and difficult to pass.  Constipation is very common. At some point in life, it affects almost everyone. Since everyone's bowel habits are different, what is constipation to one person may not be to another. Your healthcare provider may do tests to diagnose constipation. It depends on what he or she finds when evaluating you.    Symptoms of constipation include:    Abdominal pain    Bloating    Vomiting    Painful bowel movements    Itching, swelling, bleeding, or pain around the anus  Causes  Constipation can have many causes. These include:    Diet low in fiber    Too much dairy    Not drinking enough liquids    Lack of exercise or physical activity (especially true for older adults)    Changes in lifestyle or daily routine, including pregnancy, aging, work, and travel    Frequent use or misuse of laxatives    Ignoring the urge to have a bowel movement or delaying it until later    Medicines, such as certain prescription pain medicines, iron supplements, antacids, certain antidepressants, and calcium supplements    Diseases like irritable bowel syndrome, bowel obstructions, stroke, diabetes, thyroid disease, Parkinson disease, hemorrhoids, and colon cancer  Complications  Potential complications of constipation can include:    Hemorrhoids    Rectal bleeding from hemorrhoids or anal fissures (skin tears)    Hernias    Dependency on laxatives    Chronic constipation    Fecal impaction, a severe form of constipation in which a large amount of hard stool is in your rectum that you can't  pass    Bowel obstruction or perforation  Home care  All treatment should be done after talking with your healthcare provider. This is especially true if you have another medical problems, are taking prescription medicines, or are an older adult. Treatment most often involves lifestyle changes. You may also need medicines. Your healthcare provider will tell you which will work best for you. Follow the advice below to help avoid this problem in the future.  Lifestyle changes  These lifestyle changes can help prevent constipation:    Diet. Eat a high-fiber diet, with fresh fruit and vegetables, and reduce dairy intake, meats, and processed foods    Fluids. It's important to get enough fluids each day. Drink plenty of water when you eat more fiber. If you are on diet that limits the amount of fluid you can have, talk about this with your healthcare provider.    Regular exercise. Check with your healthcare provider first.  Medicines  Take any medicines as directed. Some laxatives are safe to use only every now and then. Others can be taken on a regular basis. While laxatives don't cause bowel dependence, they are treating the symptoms. So your constipation may return if you don't make other changes. Talk with your healthcare provider or pharmacist if you have questions.  Prescription pain medicines can cause constipation. If you are taking this kind of medicine, ask your healthcare provider if you should also take a stool softener.  Medicines you may take to treat constipation include:    Fiber supplements    Stool softeners    Laxatives    Enemas    Rectal suppositories  Follow-up care  Follow up with your healthcare provider if symptoms don't get better in the next few days. You may need to have more tests or see a specialist.  Call 911  Call 911 if any of these occur:    Trouble breathing    Stiff, rigid abdomen that is severely painful to touch    Confusion    Fainting or loss of consciousness    Rapid heart  rate    Chest pain  When to seek medical advice  Call your healthcare provider right away if any of these occur:    Fever of 100.4 F (38 C) or higher, or as directed by your healthcare provider    Failure to resume normal bowel movements    Pain in your abdomen or back gets worse    Nausea or vomiting    Swelling in your abdomen    Blood in the stool    Black, tarry stool    Involuntary weight loss    Weakness  Date Last Reviewed: 6/1/2018 2000-2018 [employer]. [address] [place] [address], [place] [address]. All rights reserved. This information is not intended as a substitute for professional medical care. Always follow your healthcare professional's instructions.               Return in about 3 months (around 2/4/2020) for Routine Visit.    Johnny Parsons PA-C  [facility]

## 2019-11-05 ASSESSMENT — ANXIETY QUESTIONNAIRES: GAD7 TOTAL SCORE: 18

## 2019-11-13 ENCOUNTER — OFFICE VISIT (OUTPATIENT)
Dept: PSYCHOLOGY | Facility: CLINIC | Age: 34
End: 2019-11-13
Attending: PHYSICIAN ASSISTANT
Payer: COMMERCIAL

## 2019-11-13 ENCOUNTER — FCC EXTENDED DOCUMENTATION (OUTPATIENT)
Dept: PSYCHOLOGY | Facility: CLINIC | Age: 34
End: 2019-11-13

## 2019-11-13 DIAGNOSIS — F41.1 GENERALIZED ANXIETY DISORDER: ICD-10-CM

## 2019-11-13 DIAGNOSIS — F33.1 MODERATE RECURRENT MAJOR DEPRESSION (H): Primary | ICD-10-CM

## 2019-11-13 PROCEDURE — 90834 PSYTX W PT 45 MINUTES: CPT | Performed by: SOCIAL WORKER

## 2019-11-13 ASSESSMENT — COLUMBIA-SUICIDE SEVERITY RATING SCALE - C-SSRS
6. HAVE YOU EVER DONE ANYTHING, STARTED TO DO ANYTHING, OR PREPARED TO DO ANYTHING TO END YOUR LIFE?: NO
1. IN YOUR LIFETIME, HAVE YOU WISHED YOU WERE DEAD OR WISHED YOU COULD GO TO SLEEP AND NOT WAKE UP?: SEE BELOW
2. HAVE YOU ACTUALLY HAD ANY THOUGHTS OF KILLING YOURSELF?: NO
TOTAL  NUMBER OF INTERRUPTED ATTEMPTS PAST 3 MONTHS: NO
1. IN THE PAST MONTH, HAVE YOU WISHED YOU WERE DEAD OR WISHED YOU COULD GO TO SLEEP AND NOT WAKE UP?: YES
4. HAVE YOU HAD THESE THOUGHTS AND HAD SOME INTENTION OF ACTING ON THEM?: NO
REASONS FOR IDEATION LIFETIME: MOSTLY TO END OR STOP THE PAIN (YOU COULDN'T GO ON LIVING WITH THE PAIN OR HOW YOU WERE FEELING)
ATTEMPT PAST THREE MONTHS: NO
TOTAL  NUMBER OF ABORTED OR SELF INTERRUPTED ATTEMPTS PAST LIFETIME: NO
3. HAVE YOU BEEN THINKING ABOUT HOW YOU MIGHT KILL YOURSELF?: YES
5. HAVE YOU STARTED TO WORK OUT OR WORKED OUT THE DETAILS OF HOW TO KILL YOURSELF? DO YOU INTEND TO CARRY OUT THIS PLAN?: NO
ATTEMPT LIFETIME: NO
2. HAVE YOU ACTUALLY HAD ANY THOUGHTS OF KILLING YOURSELF?: SEE BELOW
2. HAVE YOU ACTUALLY HAD ANY THOUGHTS OF KILLING YOURSELF LIFETIME?: YES
5. HAVE YOU STARTED TO WORK OUT OR WORKED OUT THE DETAILS OF HOW TO KILL YOURSELF? DO YOU INTEND TO CARRY OUT THIS PLAN?: NO
TOTAL  NUMBER OF INTERRUPTED ATTEMPTS LIFETIME: NO
TOTAL  NUMBER OF ABORTED OR SELF INTERRUPTED ATTEMPTS PAST 3 MONTHS: NO
6. HAVE YOU EVER DONE ANYTHING, STARTED TO DO ANYTHING, OR PREPARED TO DO ANYTHING TO END YOUR LIFE?: NO
4. HAVE YOU HAD THESE THOUGHTS AND HAD SOME INTENTION OF ACTING ON THEM?: NO
1. IN THE PAST MONTH, HAVE YOU WISHED YOU WERE DEAD OR WISHED YOU COULD GO TO SLEEP AND NOT WAKE UP?: NO

## 2019-11-13 ASSESSMENT — ANXIETY QUESTIONNAIRES
6. BECOMING EASILY ANNOYED OR IRRITABLE: NEARLY EVERY DAY
5. BEING SO RESTLESS THAT IT IS HARD TO SIT STILL: MORE THAN HALF THE DAYS
3. WORRYING TOO MUCH ABOUT DIFFERENT THINGS: NEARLY EVERY DAY
GAD7 TOTAL SCORE: 19
2. NOT BEING ABLE TO STOP OR CONTROL WORRYING: NEARLY EVERY DAY
IF YOU CHECKED OFF ANY PROBLEMS ON THIS QUESTIONNAIRE, HOW DIFFICULT HAVE THESE PROBLEMS MADE IT FOR YOU TO DO YOUR WORK, TAKE CARE OF THINGS AT HOME, OR GET ALONG WITH OTHER PEOPLE: EXTREMELY DIFFICULT
1. FEELING NERVOUS, ANXIOUS, OR ON EDGE: NEARLY EVERY DAY
7. FEELING AFRAID AS IF SOMETHING AWFUL MIGHT HAPPEN: NEARLY EVERY DAY

## 2019-11-13 ASSESSMENT — PATIENT HEALTH QUESTIONNAIRE - PHQ9
5. POOR APPETITE OR OVEREATING: MORE THAN HALF THE DAYS
SUM OF ALL RESPONSES TO PHQ QUESTIONS 1-9: 19

## 2019-11-13 NOTE — PROGRESS NOTES
Progress Note - Initial Session    Client Name:  Lazaro Ingram Date: 11/13/19         Service Type: Individual  Video Visit: No     Session Start Time: 1:00pm  Session End Time: 1:50pm     Session Length: 50min    Session #: 1    Attendees: Client attended alone     DATA:  Diagnostic Assessment in progress.  Unable to complete documentation at the conclusion of the first session due to needing additional time to gather patient's social and health history. Therapist reviewed history of safety concerns and completed a safety plan with patient.        Interactive Complexity: No  Crisis: No    Intervention:  Assessment/rapport building: Therapist gathered from patient his reasons for coming to services. Patient shared the recent history of experiencing increased anxiety, stress, and anger and reports feeling it is time to address his mood symptoms and gain additional support.   Therapist completed the following assessments: CSSR, GAD7, PHQ9, WHODAS, and CGI.    Due to history of safety concerns, therapist completed a safety plan with patient. Therapist involved patient in problem solving stressful situations and reviewed his current coping strategies which include: talking to his roommate, using exercise, walking, taking small concrete steps daily towards his goals, utilizing breathing apps. Therapist affirmed positive steps patient has already taken to enhance his well being.     ASSESSMENT:  Mental Status Assessment:  Appearance:   Appropriate   Eye Contact:   Good   Psychomotor Behavior: Restless   Attitude:   Cooperative   Orientation:   All  Speech   Rate / Production: Hyperverbal    Volume:  Normal   Mood:    Anxious   Affect:    Worrisome   Thought Content:  Clear   Thought Form:  Coherent  Logical   Insight:    Good       Safety Issues and Plan for Safety and Risk Management:  Client denies current fears or concerns for personal safety.  Client denies current or recent suicidal ideation  or behaviors.  Client denies current or recent homicidal ideation or behaviors.  Client denies current or recent self injurious behavior or ideation.  Client denies other safety concerns.  A safety and risk management plan has been developed including: Patient consented to co-developed safety plan.  Safety and risk management plan was completed.  Patient agreed to use safety plan should any safety concerns arise.  A copy was given to the patient.  Client reports there are no firearms in the house.      Diagnostic Criteria:  A. Excessive anxiety and worry about a number of events or activities (such as work or school performance).   B. The person finds it difficult to control the worry.  C. Select 3 or more symptoms (required for diagnosis). Only one item is required in children.   - Restlessness or feeling keyed up or on edge.    - Being easily fatigued.    - Difficulty concentrating or mind going blank.    - Irritability.    - Muscle tension.    - Sleep disturbance (difficulty falling or staying asleep, or restless unsatisfying sleep).   D. The focus of the anxiety and worry is not confined to features of an Axis I disorder.  E. The anxiety, worry, or physical symptoms cause clinically significant distress or impairment in social, occupational, or other important areas of functioning.   F. The disturbance is not due to the direct physiological effects of a substance (e.g., a drug of abuse, a medication) or a general medical condition (e.g., hyperthyroidism) and does not occur exclusively during a Mood Disorder, a Psychotic Disorder, or a Pervasive Developmental Disorder.  A) Recurrent episode(s) - symptoms have been present during the same 2-week period and represent a change from previous functioning 5 or more symptoms (required for diagnosis)   - Depressed mood. Note: In children and adolescents, can be irritable mood.     - Diminished interest or pleasure in all, or almost all, activities.    - Significant  "weight gainincrease in appetite.    - Decreased sleep.    - Psychomotor activity agitation.    - Fatigue or loss of energy.    - Feelings of worthlessness or inappropriate and excessive guilt.    - Diminished ability to think or concentrate, or indecisiveness.   B) The symptoms cause clinically significant distress or impairment in social, occupational, or other important areas of functioning  C) The episode is not attributable to the physiological effects of a substance or to another medical condition  D) The occurence of major depressive episode is not better explained by other thought / psychotic disorders  E) There has never been a manic episode or hypomanic episode      DSM5 Diagnoses: (Sustained by DSM5 Criteria Listed Above)  Diagnoses: 296.32 (F33.1) Major Depressive Disorder, Recurrent Episode, Moderate _  300.02 (F41.1) Generalized Anxiety Disorder  Psychosocial & Contextual Factors: Stress in work environment    WHODAS 2.0 Total Score 11/13/2019   Total Score 29         Collateral Reports Completed:  Routed note to PCP      PLAN: (Homework, other):  Client stated that he may follow up for ongoing services with [facility].  Patient is scheduled to return to complete the diagnostic assessment 11/25/19. Patient declined to be placed on the waitlist for a sooner appointment.      Shira Stallworth, [facility]                                                    Lazaro Ingram     SAFETY PLAN:  Step 1: Warning signs / cues (Thoughts, images, mood, situation, behavior) that a crisis may be developing:    Thoughts: \"I can't do this anymore\" and \"Nothing makes it better\"    Thinking Processes: ruminations (can't stop thinking about my problems), racing thoughts and highly critical and negative thoughts    Mood: intense anger, intense worry, agitation and mood swings    Behaviors: using drugs, aggression, not taking care of myself, not taking care of my responsibilities and not sleeping " "enough    Situations: relationship problems and financial stress   Step 2: Coping strategies - Things I can do to take my mind off of my problems without contacting another person (relaxation technique, physical activity):    Distress Tolerance Strategies:  relaxation activities: use eunice on watch, listen to positive and upbeat music: \ and watch a funny movie    Physical Activities: go for a walk and exercise    Focus on helpful thoughts:  \"I will get through this\"  Step 3: People and social settings that provide distraction:   Name: Chang Romo       Phone: [phone]   [place]   Step 4: Remind myself of people and things that are important to me and worth living for:   I have a wonderful family and amazing nephews. There's always another challenge.  Step 5: When I am in crisis, I can ask these people to help me use my safety plan:  Name: Chang Romo       Phone: [phone]   Name: Ольга Mcgovern Phone: [phone]  Step 6: Making the environment safe:     be around others  Step 7: Professionals or agencies I can contact during a crisis:    [facility] Daytime Number: [phone]    Suicide Prevention Lifeline: 1-463-276-TALK 8255)    Crisis Text Line Service (available 24 hours a day, 7 days a week): Text [place] to 406977  Local Crisis Services: [facility] crisis service: 1-922.349.8613    Call 911 or go to my nearest emergency department.   I helped develop this safety plan and agree to use it when needed.  I have been given a copy of this plan.      Client signature _________________________________________________________________  Today s date:  11/13/2019  Adapted from Safety Plan Template 2008 Rebekah Fregoso and Felipe Jenkins is reprinted with the express permission of the authors.  No portion of the Safety Plan Template may be reproduced without the express, written permission.  You can contact the authors at [email or web address] or " [email or web address].

## 2019-11-13 NOTE — PATIENT INSTRUCTIONS
"Lazaro Ingram     SAFETY PLAN:  Step 1: Warning signs / cues (Thoughts, images, mood, situation, behavior) that a crisis may be developing:    Thoughts: \"I can't do this anymore\" and \"Nothing makes it better\"    Thinking Processes: ruminations (can't stop thinking about my problems), racing thoughts and highly critical and negative thoughts    Mood: intense anger, intense worry, agitation and mood swings    Behaviors: using drugs, aggression, not taking care of myself, not taking care of my responsibilities and not sleeping enough    Situations: relationship problems and financial stress   Step 2: Coping strategies - Things I can do to take my mind off of my problems without contacting another person (relaxation technique, physical activity):    Distress Tolerance Strategies:  relaxation activities: use eunice on watch, listen to positive and upbeat music: \ and watch a funny movie    Physical Activities: go for a walk and exercise    Focus on helpful thoughts:  \"I will get through this\"  Step 3: People and social settings that provide distraction:   Name: Chang Romo       Phone: [phone]   [place]   Step 4: Remind myself of people and things that are important to me and worth living for:   I have a wonderful family and amazing nephews. There's always another challenge.  Step 5: When I am in crisis, I can ask these people to help me use my safety plan:  Name: Chang Romo       Phone: [phone]   Name: Ольга Mcgovern Phone: [phone]  Step 6: Making the environment safe:     be around others  Step 7: Professionals or agencies I can contact during a crisis:    [facility] Daytime Number: [phone]    Suicide Prevention Lifeline: 7-680-922-TALK (8295)    Crisis Text Line Service (available 24 hours a day, 7 days a week): Text [place] to 455381  Local Crisis Services: [facility] crisis service: 1-752.769.7702    Call 911 or go to my " [facility] emergency department.   I helped develop this safety plan and agree to use it when needed.  I have been given a copy of this plan.      Client signature _________________________________________________________________  Today s date:  11/13/2019  Adapted from Safety Plan Template 2008 Rebekah Fregoso and Felipe Jenkins is reprinted with the express permission of the authors.  No portion of the Safety Plan Template may be reproduced without the express, written permission.  You can contact the authors at [email or web address] or [email or web address].

## 2019-11-13 NOTE — Clinical Note
Trini Turner,Your patient presented to [facility] for a diagnostic evaluation today.  They will be beginning individual therapy with me.  Please do not hesitate to contact me if you have any questions in regards to Lazaro Ingram's care.    Thanks for the referral! We'll finish up his diagnostic assessment at the next session.Shira Stallworth, [facility]

## 2019-11-14 ENCOUNTER — TELEPHONE (OUTPATIENT)
Dept: FAMILY MEDICINE | Facility: CLINIC | Age: 34
End: 2019-11-14

## 2019-11-14 ASSESSMENT — ANXIETY QUESTIONNAIRES: GAD7 TOTAL SCORE: 19

## 2019-11-14 NOTE — TELEPHONE ENCOUNTER
Makayla from [employer] needs clarification/certification of disability dates for the patient as documentation on the forms does not match the dates of medical records.

## 2019-11-20 NOTE — TELEPHONE ENCOUNTER
Called [employer]. Date on form does not match of what patient report to them. Patient reports a date of 10/26 and on form it has 10/28.  Notified Johnny and fixed date on form. Form faxed.    Jesus Purdy MA

## 2019-11-25 ENCOUNTER — OFFICE VISIT (OUTPATIENT)
Dept: PSYCHOLOGY | Facility: CLINIC | Age: 34
End: 2019-11-25
Payer: COMMERCIAL

## 2019-11-25 DIAGNOSIS — F41.1 GENERALIZED ANXIETY DISORDER: ICD-10-CM

## 2019-11-25 DIAGNOSIS — F33.1 MODERATE RECURRENT MAJOR DEPRESSION (H): Primary | ICD-10-CM

## 2019-11-25 PROCEDURE — 90791 PSYCH DIAGNOSTIC EVALUATION: CPT | Performed by: SOCIAL WORKER
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
## 2019-11-25 NOTE — Clinical Note
Marco Turner,I saw Frandy today and completed the diagnostic assessment with him. I've cc'd you for your review.Thanks,Shira Stallworth, LORELEISW

## 2019-12-09 ENCOUNTER — OFFICE VISIT (OUTPATIENT)
Dept: PSYCHOLOGY | Facility: CLINIC | Age: 34
End: 2019-12-09
Payer: COMMERCIAL

## 2019-12-09 DIAGNOSIS — F33.1 MODERATE RECURRENT MAJOR DEPRESSION (H): Primary | ICD-10-CM

## 2019-12-09 DIAGNOSIS — F41.1 GENERALIZED ANXIETY DISORDER: ICD-10-CM

## 2019-12-09 PROCEDURE — 90834 PSYTX W PT 45 MINUTES: CPT | Performed by: SOCIAL WORKER

## 2019-12-09 ASSESSMENT — ANXIETY QUESTIONNAIRES
5. BEING SO RESTLESS THAT IT IS HARD TO SIT STILL: MORE THAN HALF THE DAYS
1. FEELING NERVOUS, ANXIOUS, OR ON EDGE: MORE THAN HALF THE DAYS
3. WORRYING TOO MUCH ABOUT DIFFERENT THINGS: NEARLY EVERY DAY
2. NOT BEING ABLE TO STOP OR CONTROL WORRYING: SEVERAL DAYS
GAD7 TOTAL SCORE: 13
IF YOU CHECKED OFF ANY PROBLEMS ON THIS QUESTIONNAIRE, HOW DIFFICULT HAVE THESE PROBLEMS MADE IT FOR YOU TO DO YOUR WORK, TAKE CARE OF THINGS AT HOME, OR GET ALONG WITH OTHER PEOPLE: VERY DIFFICULT
7. FEELING AFRAID AS IF SOMETHING AWFUL MIGHT HAPPEN: SEVERAL DAYS
6. BECOMING EASILY ANNOYED OR IRRITABLE: NEARLY EVERY DAY

## 2019-12-09 ASSESSMENT — PATIENT HEALTH QUESTIONNAIRE - PHQ9
SUM OF ALL RESPONSES TO PHQ QUESTIONS 1-9: 14
5. POOR APPETITE OR OVEREATING: SEVERAL DAYS

## 2019-12-09 NOTE — PROGRESS NOTES
Progress Note    Patient Name: Lazaro Ingram  Date: 12/9/19         Service Type: Individual  Video Visit: No     Session Start Time: 1:00pm  Session End Time: 1:50pm     Session Length: 50min    Session #: 3    Attendees: Client attended alone     Treatment Plan Last Reviewed: 12/9/19  PHQ-9:14 / SURENDRA-7 :13   12/9/19    DATA  Interactive Complexity: No  Crisis: No       Progress Since Last Session (Related to Symptoms / Goals / Homework):   Symptoms: Improving Therapist reviewed with patient the decrease in GAD7 and PHQ9 scores. Patient reports overall feeling positive that he has taken action steps towards his health    Homework: Achieved / completed to satisfaction      Episode of Care Goals: Satisfactory progress - PREPARATION (Decided to change - considering how); Intervened by negotiating a change plan and determining options / strategies for behavior change, identifying triggers, exploring social supports, and working towards setting a date to begin behavior change     Current / Ongoing Stressors and Concerns:   Stressful work environment, social anxiety     Treatment Objective(s) Addressed in This Session:   Treatment planning   Patient will learn 1-3 cognitive coping skills to manage anxiety and enhance success in social settings.     Intervention:   Motivational interviewing: Therapist discussed with patient his goals for therapy and support patient to prioritize goals. Therapist collaboratively developed goals with patient.   CBT: Patient shared recent stressors at work and how he has been coping. Therapist reflected to patient his expectations of himself and of others. Therapist considered with patient healthy expectations of himself and others and how he might implement these. Therapist reflected with patient on the impact of asking for accommodations at work, how this was received and how patient feels about it. Patient considered strategies to reduce  his high level of expectations of himself to reduce anxiety at work. Therapist considered with patient his longer term career goals and how he might begin taking steps towards those goals.        ASSESSMENT: Current Emotional / Mental Status (status of significant symptoms):   Risk status (Self / Other harm or suicidal ideation)   Patient denies current fears or concerns for personal safety.   Patient denies current or recent suicidal ideation or behaviors.   Patientdenies current or recent homicidal ideation or behaviors.   Patient denies current or recent self injurious behavior or ideation.   Patient denies other safety concerns.    Patient reports there has been no change in risk factors since their last session.     Patient reports there has been no change in protective factors since their last session.     A safety and risk management plan has been developed including: Patient consented to co-developed safety plan.  Safety and risk management plan was completed.  Patient agreed to use safety plan should any safety concerns arise.  A copy was given to the patient.     Appearance:   Appropriate    Eye Contact:   Good    Psychomotor Behavior: Normal    Attitude:   Cooperative    Orientation:   All   Speech    Rate / Production: Normal     Volume:  Normal    Mood:    Anxious  Normal   Affect:    Worrisome    Thought Content:  Clear    Thought Form:  Coherent  Logical    Insight:    Good      Medication Review:   No current psychiatric medications prescribed     Medication Compliance:   NA     Changes in Health Issues:   None reported     Chemical Use Review:   Substance Use: Chemical use reviewed, no active concerns identified      Tobacco Use: No change in amount of tobacco use since last session.  Pre-contemplation    Diagnosis:  1. Moderate recurrent major depression (H)    2. Generalized anxiety disorder        Collateral Reports Completed:   Not Applicable    PLAN: (Patient Tasks / Therapist Tasks /  Other)  Patient to practice giving himself a break and setting reasonable expectations of himself.        Shira Sneedana, LICSW                                                         ______________________________________________________________________    Treatment Plan    Patient's Name: Lazaro Ingram  YOB: 1985    Date: 12/9/19    DSM5 Diagnoses: 296.32 (F33.1) Major Depressive Disorder, Recurrent Episode, Moderate _ or 300.02 (F41.1) Generalized Anxiety Disorder  Psychosocial / Contextual Factors: Stress in work environment  WHODAS: 29    Referral / Collaboration:  Referral to another professional/service is not indicated at this time.    Anticipated number of session or this episode of care: 10-12      MeasurableTreatment Goal(s) related to diagnosis / functional impairment(s)  Goal 1: Patient will reduce experience of anxiety by half as measured by the GAD7.    I will know I've met my goal when I can do activities outside of the house 2x a week.      Objective #A     Patient will learn and implement skills to enhance comfort in social situations.  Status: New - Date: 12/9/19     Intervention(s)  Therapist will teach relaxation skills and emotion regulation skills.    Objective #B  Patient will learn 1-3 cognitive coping skills to manage anxiety and enhance success in social settings.  Status: New - Date: 12/9/19     Intervention(s)  Therapist will utilize CBT to teach cognitive coping skills and develop confidence in social settings.        Goal 2: Patient will increase overall health and self-care as measured by patient report of engaging in 1 self-care task daily.    I will know I've met my goal when I can do activities outside of the house 2x a week.      Objective #A     Patient will learn 1-3 facts about behavioral activation, self-care, and the mind-body connection.   Status: New - Date: 12/9/19     Intervention(s)  Therapist will provide psychoeducation, and utilize  "motivational interviewing to support patient's goals.    Objective #B  Patient will implement 1-3 self-care strategies daily to enhance physical and emotional wellness.   Status: New - Date: 12/9/19     Intervention(s)  Therapist will provide motivational interviewing and CBT to support patient's implementation of skills and strategies to enhance wellness.          Patient has reviewed and agreed to the above plan.      Shira Stallworth, [facility]  December 9, 2019                                                  Lazaro Ingram               SAFETY PLAN:  Step 1: Warning signs / cues (Thoughts, images, mood, situation, behavior) that a crisis may be developing:  ? Thoughts: \"I can't do this anymore\" and \"Nothing makes it better\"  ? Thinking Processes: ruminations (can't stop thinking about my problems), racing thoughts and highly critical and negative thoughts  ? Mood: intense anger, intense worry, agitation and mood swings  ? Behaviors: using drugs, aggression, not taking care of myself, not taking care of my responsibilities and not sleeping enough  ? Situations: relationship problems and financial stress   Step 2: Coping strategies - Things I can do to take my mind off of my problems without contacting another person (relaxation technique, physical activity):  ? Distress Tolerance Strategies:  relaxation activities: use eunice on watch, listen to positive and upbeat music: \ and watch a funny movie  ? Physical Activities: go for a walk and exercise  ? Focus on helpful thoughts:  \"I will get through this\"  Step 3: People and social settings that provide distraction:              Name: Chang Romo                Phone: [phone]              [employer]       Step 4: Remind myself of people and things that are important to me and worth living for:       I have a wonderful family and amazing nephews. There's always another challenge.  Step 5: When I am in crisis, I can ask these people to help me use my safety " plan:  Name: RoommateChang                Phone: [phone]              Name: MomОльга      Phone: [phone]  Step 6: Making the environment safe:   ? be around others  Step 7: Professionals or agencies I can contact during a crisis:  ? [facility] Number: [phone]  ? Suicide Prevention Lifeline: 3-638-512-KAYT (3257)  ? Crisis Text Line Service (available 24 hours a day, 7 days a week): Text [place] to 619422    Local Crisis Services: [facility] crisis service: [phone]     Call 911 or go to my nearest emergency department.       I helped develop this safety plan and agree to use it when needed.  I have been given a copy of this plan.       Client signature _________________________________________________________________  Today s date:  11/13/2019  Adapted from Safety Plan Template 2008 Rebekah Fregoso and Fleipe Jenkins is reprinted with the express permission of the authors.  No portion of the Safety Plan Template may be reproduced without the express, written permission.  You can contact the authors at [email or web address] or [email or web address].

## 2019-12-10 ASSESSMENT — ANXIETY QUESTIONNAIRES: GAD7 TOTAL SCORE: 13

## 2019-12-26 ENCOUNTER — OFFICE VISIT (OUTPATIENT)
Dept: PSYCHOLOGY | Facility: CLINIC | Age: 34
End: 2019-12-26
Payer: COMMERCIAL

## 2019-12-26 DIAGNOSIS — F41.1 GENERALIZED ANXIETY DISORDER: ICD-10-CM

## 2019-12-26 DIAGNOSIS — F33.1 MODERATE RECURRENT MAJOR DEPRESSION (H): Primary | ICD-10-CM

## 2019-12-26 PROCEDURE — 90834 PSYTX W PT 45 MINUTES: CPT | Performed by: SOCIAL WORKER

## 2019-12-26 NOTE — PATIENT INSTRUCTIONS
Treatment Plan    Patient's Name: Lazaro Ingram  YOB: 1985    Date: 12/9/19    DSM5 Diagnoses: 296.32 (F33.1) Major Depressive Disorder, Recurrent Episode, Moderate _ or 300.02 (F41.1) Generalized Anxiety Disorder  Psychosocial / Contextual Factors: Stress in work environment  WHODAS: 29    Referral / Collaboration:  Referral to another professional/service is not indicated at this time.    Anticipated number of session or this episode of care: 10-12      MeasurableTreatment Goal(s) related to diagnosis / functional impairment(s)  Goal 1: Patient will reduce experience of anxiety by half as measured by the GAD7.    I will know I've met my goal when I can do activities outside of the house 2x a week.      Objective #A     Patient will learn and implement skills to enhance comfort in social situations.  Status: New - Date: 12/9/19     Intervention(s)  Therapist will teach relaxation skills and emotion regulation skills.    Objective #B  Patient will learn 1-3 cognitive coping skills to manage anxiety and enhance success in social settings.  Status: New - Date: 12/9/19     Intervention(s)  Therapist will utilize CBT to teach cognitive coping skills and develop confidence in social settings.        Goal 2: Patient will increase overall health and self-care as measured by patient report of engaging in 1 self-care task daily.    I will know I've met my goal when I can do activities outside of the house 2x a week.      Objective #A     Patient will learn 1-3 facts about behavioral activation, self-care, and the mind-body connection.   Status: New - Date: 12/9/19     Intervention(s)  Therapist will provide psychoeducation, and utilize motivational interviewing to support patient's goals.    Objective #B  Patient will implement 1-3 self-care strategies daily to enhance physical and emotional wellness.   Status: New - Date: 12/9/19     Intervention(s)  Therapist will provide motivational  interviewing and CBT to support patient's implementation of skills and strategies to enhance wellness.          Patient has reviewed and agreed to the above plan.      Shira Stallworth, [facility]  December 9, 2019

## 2019-12-26 NOTE — PROGRESS NOTES
Progress Note    Patient Name: Lazaro Ingram  Date: 12/26/19         Service Type: Individual  Video Visit: No     Session Start Time: 1:00pm  Session End Time: 1:48pm     Session Length: 48min    Session #: 4    Attendees: Client attended alone     Treatment Plan Last Reviewed: 12/9/19  PHQ-9:14 / SURENDRA-7 :13   12/9/19    DATA  Interactive Complexity: No  Crisis: No       Progress Since Last Session (Related to Symptoms / Goals / Homework):   Symptoms: No change Patient reports having some difficulty at work in moderating stress related to the holiday season's work load    Homework: Achieved / completed to satisfaction      Episode of Care Goals: Satisfactory progress - ACTION (Actively working towards change); Intervened by reinforcing change plan / affirming steps taken     Current / Ongoing Stressors and Concerns:   Stressful work environment, social anxiety     Treatment Objective(s) Addressed in This Session:      Patient will learn 1-3 cognitive coping skills to manage anxiety and enhance success in social settings.     Intervention:   CBT: Patient provided updates about his week at work and his social time with family and friends. Patient elected to focus today's session on social anxiety and provided an example of a social situation he declined. Therapist engaged patient in creating a behavior chain analysis to understand his decision making in that instance. Therapist coached patient to identify thoughts, feelings, and behaviors present in the situation. Patient shared reflections at seeing his decision making process written down.     Therapist guided patient to identify core beliefs guiding his decision making. Therapist coached patient to develop a list of questions to reflect on in his decision making process including, what am I afraid of or avoiding? Will I regret my decision? What would be the positive outcomes if I participated? Patient recorded his  questions and considered their application to the situation from yesterday.        ASSESSMENT: Current Emotional / Mental Status (status of significant symptoms):   Risk status (Self / Other harm or suicidal ideation)   Patient denies current fears or concerns for personal safety.   Patient denies current or recent suicidal ideation or behaviors.   Patientdenies current or recent homicidal ideation or behaviors.   Patient denies current or recent self injurious behavior or ideation.   Patient denies other safety concerns.    Patient reports there has been no change in risk factors since their last session.     Patient reports there has been no change in protective factors since their last session.     A safety and risk management plan has been developed including: Patient consented to co-developed safety plan.  Safety and risk management plan was completed.  Patient agreed to use safety plan should any safety concerns arise.  A copy was given to the patient.     Appearance:   Appropriate    Eye Contact:   Good    Psychomotor Behavior: Normal    Attitude:   Cooperative    Orientation:   All   Speech    Rate / Production: Normal     Volume:  Normal    Mood:    Normal   Affect:    Worrisome    Thought Content:  Clear    Thought Form:  Coherent  Logical    Insight:    Good      Medication Review:   No current psychiatric medications prescribed     Medication Compliance:   NA     Changes in Health Issues:   None reported     Chemical Use Review:   Substance Use: Chemical use reviewed, no active concerns identified      Tobacco Use: No change in amount of tobacco use since last session.  Pre-contemplation    Diagnosis:  1. Moderate recurrent major depression (H)    2. Generalized anxiety disorder        Collateral Reports Completed:   Not Applicable    PLAN: (Patient Tasks / Therapist Tasks / Other)  Patient to call to check on availability for next day off, 1/9/20.  Patient to practice asking questions to understand his  decision making regarding social opportunities.        Shira Hughesamandeep, LICSW                                                         ______________________________________________________________________    Treatment Plan    Patient's Name: Lazaro Ingram  YOB: 1985    Date: 12/9/19    DSM5 Diagnoses: 296.32 (F33.1) Major Depressive Disorder, Recurrent Episode, Moderate _ or 300.02 (F41.1) Generalized Anxiety Disorder  Psychosocial / Contextual Factors: Stress in work environment  WHODAS: 29    Referral / Collaboration:  Referral to another professional/service is not indicated at this time.    Anticipated number of session or this episode of care: 10-12      MeasurableTreatment Goal(s) related to diagnosis / functional impairment(s)  Goal 1: Patient will reduce experience of anxiety by half as measured by the GAD7.    I will know I've met my goal when I can do activities outside of the house 2x a week.      Objective #A     Patient will learn and implement skills to enhance comfort in social situations.  Status: New - Date: 12/9/19     Intervention(s)  Therapist will teach relaxation skills and emotion regulation skills.    Objective #B  Patient will learn 1-3 cognitive coping skills to manage anxiety and enhance success in social settings.  Status: New - Date: 12/9/19     Intervention(s)  Therapist will utilize CBT to teach cognitive coping skills and develop confidence in social settings.        Goal 2: Patient will increase overall health and self-care as measured by patient report of engaging in 1 self-care task daily.    I will know I've met my goal when I can do activities outside of the house 2x a week.      Objective #A     Patient will learn 1-3 facts about behavioral activation, self-care, and the mind-body connection.   Status: New - Date: 12/9/19     Intervention(s)  Therapist will provide psychoeducation, and utilize motivational interviewing to support patient's  "goals.    Objective #B  Patient will implement 1-3 self-care strategies daily to enhance physical and emotional wellness.   Status: New - Date: 12/9/19     Intervention(s)  Therapist will provide motivational interviewing and CBT to support patient's implementation of skills and strategies to enhance wellness.          Patient has reviewed and agreed to the above plan.      Shira Stallworth, [facility]  December 9, 2019                                                  Lazaro Ingram               SAFETY PLAN:  Step 1: Warning signs / cues (Thoughts, images, mood, situation, behavior) that a crisis may be developing:  ? Thoughts: \"I can't do this anymore\" and \"Nothing makes it better\"  ? Thinking Processes: ruminations (can't stop thinking about my problems), racing thoughts and highly critical and negative thoughts  ? Mood: intense anger, intense worry, agitation and mood swings  ? Behaviors: using drugs, aggression, not taking care of myself, not taking care of my responsibilities and not sleeping enough  ? Situations: relationship problems and financial stress   Step 2: Coping strategies - Things I can do to take my mind off of my problems without contacting another person (relaxation technique, physical activity):  ? Distress Tolerance Strategies:  relaxation activities: use eunice on watch, listen to positive and upbeat music: \ and watch a funny movie  ? Physical Activities: go for a walk and exercise  ? Focus on helpful thoughts:  \"I will get through this\"  Step 3: People and social settings that provide distraction:              Name: Chang Romo                Phone: [phone]              park       Step 4: Remind myself of people and things that are important to me and worth living for:       I have a wonderful family and amazing nephews. There's always another challenge.  Step 5: When I am in crisis, I can ask these people to help me use my safety plan:  Name: Chang Romo                Phone: " [phone]              Name: Ольга Mcgovern      Phone: [phone]  Step 6: Making the environment safe:   ? be around others  Step 7: Professionals or agencies I can contact during a crisis:  ? [facility] Number: [phone]  ? Suicide Prevention Lifeline: 5-170-902-TALK (8010)  ? Crisis Text Line Service (available 24 hours a day, 7 days a week): Text [place] to 523221    Local Crisis Services: [facility] crisis service: [phone]     Call 911 or go to my nearest emergency department.       I helped develop this safety plan and agree to use it when needed.  I have been given a copy of this plan.       Client signature _________________________________________________________________  Today s date:  11/13/2019  Adapted from Safety Plan Template 2008 Rebekah Fregoso and Felipe Jenkins is reprinted with the express permission of the authors.  No portion of the Safety Plan Template may be reproduced without the express, written permission.  You can contact the authors at [email or web address] or [email or web address].

## 2020-02-11 ENCOUNTER — FCC EXTENDED DOCUMENTATION (OUTPATIENT)
Dept: PSYCHOLOGY | Facility: CLINIC | Age: 35
End: 2020-02-11

## 2020-02-11 NOTE — PROGRESS NOTES
"                    Discharge Summary  Multiple Sessions    Client Name: Lazaro Ingram MRN#: [id number] YOB: 1985      Intake / Discharge Date: Intake: 11/13/2019  Discharge: 2/11/2020      DSM5 Diagnoses: (Sustained by DSM5 Criteria Listed Above)  DSM5 Diagnoses: (Sustained by DSM5 Criteria Listed Above)  Diagnoses:      296.32 (F33.1) Major Depressive Disorder, Recurrent Episode, Moderate _  300.02 (F41.1) Generalized Anxiety Disorder  Psychosocial & Contextual Factors: Stress in work environment  WHODAS:   WHODAS 2.0 Total Score 11/13/2019   Total Score 29             Presenting Concern:  The reason for seeking services at this time is: seeking support for anxiety and stress management.     Patient reports in the last 6-12 months his stress has been consistently building and he has not made time to process the stress. Patient reports daily anxiety reporting he is anxious daily, worries very often in the day, has difficulty relaxing, and is often feeling on edge. Patient reports he tends to ruminate on thoughts, and reports that he spends much time trying to logically work through his stressors. Patient reports due to anxiety he will sometimes avoid difficult situations or avoid social activities with others. Patient reports having muscle tension often due to stress.      Patient reports the stress has built some much recently that he has taken a leave of absence. Patient reports the stress and anxiety has manifested at work in situations of feeling that he \"snapped\" and had to stop working on a project to regroup. Patient also reports feeling generally overwhelmed at work, noting there are always more tasks to complete in a day than there is time, and reports when feeling overwhelmed it becomes difficult to concentrate. Patient reports he feels it is difficult to advocate for himself at work.     Patient reports additional stressors in the last 2 years that may be contributing to his stress " "level include the end of a 6 year relationship and the death of family members.      Reason for Discharge:  Client did not return      Disposition at Time of Last Encounter:   Comments:   Patient's last attended appointment was 12/26/2019. Patient declined to schedule available appointments as his work schedule was being determined. Patient reported he would call to check on availability for 1/9/2020 his day off. Therapist mailed letter 1/24/2020. No response was received.     Risk Management as of assessment :   Client has had a history of suicidal ideation: History of SI with considering methods in his early 20s and self-injurious behavior: history of punching walls and punching himself in his 20s. Patient reported punching himself 8-9 months ago and denies a history of suicide attempts, homicidal ideation, homicidal behavior and and other safety concerns  A safety and risk management plan has been developed including: Patient consented to co-developed safety plan.  Safety and risk management plan was completed.  Patient agreed to use safety plan should any safety concerns arise.  A copy was given to the patient.      Referred To:  No referrals made. Patient is welcome to return to [facility] in the future.        Shira Stallworth, Dorothe[facility] Dix Psychiatric CenterSW   2/11/2020      Lazaro Ingram               SAFETY PLAN:  Step 1: Warning signs / cues (Thoughts, images, mood, situation, behavior) that a crisis may be developing:  ? Thoughts: \"I can't do this anymore\" and \"Nothing makes it better\"  ? Thinking Processes: ruminations (can't stop thinking about my problems), racing thoughts and highly critical and negative thoughts  ? Mood: intense anger, intense worry, agitation and mood swings  ? Behaviors: using drugs, aggression, not taking care of myself, not taking care of my responsibilities and not sleeping enough  ? Situations: relationship problems and financial stress   Step 2: Coping strategies - " "Things I can do to take my mind off of my problems without contacting another person (relaxation technique, physical activity):  ? Distress Tolerance Strategies:  relaxation activities: use eunice on watch, listen to positive and upbeat music: \ and watch a funny movie  ? Physical Activities: go for a walk and exercise  ? Focus on helpful thoughts:  \"I will get through this\"  Step 3: People and social settings that provide distraction:              Name: Chang Romo                Phone: [phone]              park       Step 4: Remind myself of people and things that are important to me and worth living for:       I have a wonderful family and amazing nephews. There's always another challenge.  Step 5: When I am in crisis, I can ask these people to help me use my safety plan:  Name: Chang Romo                Phone: [phone]              Name: Ольга Mcgovern      Phone: [phone]  Step 6: Making the environment safe:   ? be around others  Step 7: Professionals or agencies I can contact during a crisis:  ? [facility] Daytime Number: [phone]  ? Suicide Prevention Lifeline: 6-132-321-TALK (7425)  ? Crisis Text Line Service (available 24 hours a day, 7 days a week): Text [place] to 568879    Local Crisis Services: [facility] crisis service: [phone]     Call 911 or go to my nearest emergency department.       I helped develop this safety plan and agree to use it when needed.  I have been given a copy of this plan.       Client signature _________________________________________________________________  Today s date:  11/13/2019  Adapted from Safety Plan Template 2008 Rebekah Fregoso and Felipe Jenkins is reprinted with the express permission of the authors.  No portion of the Safety Plan Template may be reproduced without the express, written permission.  You can contact the authors at [email or web address] or [email or web address].  "

## 2021-11-16 ENCOUNTER — HOSPITAL ENCOUNTER (EMERGENCY)
Facility: CLINIC | Age: 36
Discharge: HOME | End: 2021-11-16
Attending: EMERGENCY MEDICINE | Admitting: EMERGENCY MEDICINE
Payer: COMMERCIAL

## 2021-11-16 VITALS
TEMPERATURE: 97 F | OXYGEN SATURATION: 97 % | HEART RATE: 58 BPM | RESPIRATION RATE: 21 BRPM | DIASTOLIC BLOOD PRESSURE: 78 MMHG | SYSTOLIC BLOOD PRESSURE: 123 MMHG

## 2021-11-16 DIAGNOSIS — R00.2 PALPITATIONS: ICD-10-CM

## 2021-11-16 LAB
ANION GAP SERPL CALCULATED.3IONS-SCNC: 4 MMOL/L (ref 3–14)
ATRIAL RATE - MUSE: 69 BPM
BASOPHILS # BLD AUTO: 0.1 10E3/UL (ref 0–0.2)
BASOPHILS NFR BLD AUTO: 1 %
BUN SERPL-MCNC: 13 MG/DL (ref 7–30)
CALCIUM SERPL-MCNC: 9 MG/DL (ref 8.5–10.1)
CHLORIDE BLD-SCNC: 103 MMOL/L (ref 94–109)
CO2 SERPL-SCNC: 30 MMOL/L (ref 20–32)
CREAT SERPL-MCNC: 1.01 MG/DL (ref 0.66–1.25)
DIASTOLIC BLOOD PRESSURE - MUSE: NORMAL MMHG
EOSINOPHIL # BLD AUTO: 0.1 10E3/UL (ref 0–0.7)
EOSINOPHIL NFR BLD AUTO: 1 %
ERYTHROCYTE [DISTWIDTH] IN BLOOD BY AUTOMATED COUNT: 12.6 % (ref 10–15)
GFR SERPL CREATININE-BSD FRML MDRD: >90 ML/MIN/1.73M2
GLUCOSE BLD-MCNC: 100 MG/DL (ref 70–99)
HCT VFR BLD AUTO: 48.3 % (ref 40–53)
HGB BLD-MCNC: 16.1 G/DL (ref 13.3–17.7)
IMM GRANULOCYTES # BLD: 0 10E3/UL
IMM GRANULOCYTES NFR BLD: 1 %
INTERPRETATION ECG - MUSE: NORMAL
LYMPHOCYTES # BLD AUTO: 2.4 10E3/UL (ref 0.8–5.3)
LYMPHOCYTES NFR BLD AUTO: 29 %
MCH RBC QN AUTO: 28.6 PG (ref 26.5–33)
MCHC RBC AUTO-ENTMCNC: 33.3 G/DL (ref 31.5–36.5)
MCV RBC AUTO: 86 FL (ref 78–100)
MONOCYTES # BLD AUTO: 0.6 10E3/UL (ref 0–1.3)
MONOCYTES NFR BLD AUTO: 7 %
NEUTROPHILS # BLD AUTO: 5.2 10E3/UL (ref 1.6–8.3)
NEUTROPHILS NFR BLD AUTO: 61 %
NRBC # BLD AUTO: 0 10E3/UL
NRBC BLD AUTO-RTO: 0 /100
P AXIS - MUSE: 60 DEGREES
PLATELET # BLD AUTO: 286 10E3/UL (ref 150–450)
POTASSIUM BLD-SCNC: 3.9 MMOL/L (ref 3.4–5.3)
PR INTERVAL - MUSE: 108 MS
QRS DURATION - MUSE: 96 MS
QT - MUSE: 370 MS
QTC - MUSE: 396 MS
R AXIS - MUSE: 73 DEGREES
RBC # BLD AUTO: 5.62 10E6/UL (ref 4.4–5.9)
SODIUM SERPL-SCNC: 137 MMOL/L (ref 133–144)
SYSTOLIC BLOOD PRESSURE - MUSE: NORMAL MMHG
T AXIS - MUSE: 69 DEGREES
TROPONIN I SERPL-MCNC: <0.015 UG/L (ref 0–0.04)
TSH SERPL DL<=0.005 MIU/L-ACNC: 0.59 MU/L (ref 0.4–4)
VENTRICULAR RATE- MUSE: 69 BPM
WBC # BLD AUTO: 8.4 10E3/UL (ref 4–11)

## 2021-11-16 PROCEDURE — 84484 ASSAY OF TROPONIN QUANT: CPT | Performed by: EMERGENCY MEDICINE

## 2021-11-16 PROCEDURE — 93005 ELECTROCARDIOGRAM TRACING: CPT

## 2021-11-16 PROCEDURE — 36415 COLL VENOUS BLD VENIPUNCTURE: CPT | Performed by: EMERGENCY MEDICINE

## 2021-11-16 PROCEDURE — 99284 EMERGENCY DEPT VISIT MOD MDM: CPT

## 2021-11-16 PROCEDURE — 85025 COMPLETE CBC W/AUTO DIFF WBC: CPT | Performed by: EMERGENCY MEDICINE

## 2021-11-16 PROCEDURE — 84443 ASSAY THYROID STIM HORMONE: CPT | Performed by: EMERGENCY MEDICINE

## 2021-11-16 PROCEDURE — 80048 BASIC METABOLIC PNL TOTAL CA: CPT | Performed by: EMERGENCY MEDICINE

## 2021-11-17 ENCOUNTER — HOSPITAL ENCOUNTER (EMERGENCY)
Facility: CLINIC | Age: 36
Discharge: HOME | End: 2021-11-17
Attending: EMERGENCY MEDICINE | Admitting: EMERGENCY MEDICINE
Payer: COMMERCIAL

## 2021-11-17 ENCOUNTER — PATIENT OUTREACH (OUTPATIENT)
Dept: FAMILY MEDICINE | Facility: CLINIC | Age: 36
End: 2021-11-17
Payer: COMMERCIAL

## 2021-11-17 ENCOUNTER — APPOINTMENT (OUTPATIENT)
Dept: GENERAL RADIOLOGY | Facility: CLINIC | Age: 36
End: 2021-11-17
Attending: EMERGENCY MEDICINE
Payer: COMMERCIAL

## 2021-11-17 VITALS
OXYGEN SATURATION: 97 % | TEMPERATURE: 97.9 F | HEIGHT: 72 IN | DIASTOLIC BLOOD PRESSURE: 85 MMHG | WEIGHT: 185 LBS | RESPIRATION RATE: 9 BRPM | BODY MASS INDEX: 25.06 KG/M2 | HEART RATE: 73 BPM | SYSTOLIC BLOOD PRESSURE: 111 MMHG

## 2021-11-17 DIAGNOSIS — R00.2 PALPITATIONS: ICD-10-CM

## 2021-11-17 LAB
ALBUMIN SERPL-MCNC: 3.9 G/DL (ref 3.4–5)
ALP SERPL-CCNC: 67 U/L (ref 40–150)
ALT SERPL W P-5'-P-CCNC: 39 U/L (ref 0–70)
ANION GAP SERPL CALCULATED.3IONS-SCNC: 4 MMOL/L (ref 3–14)
AST SERPL W P-5'-P-CCNC: 12 U/L (ref 0–45)
ATRIAL RATE - MUSE: 91 BPM
BASOPHILS # BLD AUTO: 0.1 10E3/UL (ref 0–0.2)
BASOPHILS NFR BLD AUTO: 1 %
BILIRUB SERPL-MCNC: 0.4 MG/DL (ref 0.2–1.3)
BUN SERPL-MCNC: 10 MG/DL (ref 7–30)
CALCIUM SERPL-MCNC: 8.8 MG/DL (ref 8.5–10.1)
CHLORIDE BLD-SCNC: 105 MMOL/L (ref 94–109)
CO2 SERPL-SCNC: 28 MMOL/L (ref 20–32)
CREAT SERPL-MCNC: 0.95 MG/DL (ref 0.66–1.25)
D DIMER PPP FEU-MCNC: <0.27 UG/ML FEU (ref 0–0.5)
DIASTOLIC BLOOD PRESSURE - MUSE: NORMAL MMHG
EOSINOPHIL # BLD AUTO: 0 10E3/UL (ref 0–0.7)
EOSINOPHIL NFR BLD AUTO: 0 %
ERYTHROCYTE [DISTWIDTH] IN BLOOD BY AUTOMATED COUNT: 12.6 % (ref 10–15)
FLUAV RNA SPEC QL NAA+PROBE: NEGATIVE
FLUBV RNA RESP QL NAA+PROBE: NEGATIVE
GFR SERPL CREATININE-BSD FRML MDRD: >90 ML/MIN/1.73M2
GLUCOSE BLD-MCNC: 82 MG/DL (ref 70–99)
HCT VFR BLD AUTO: 49.2 % (ref 40–53)
HGB BLD-MCNC: 16.6 G/DL (ref 13.3–17.7)
IMM GRANULOCYTES # BLD: 0 10E3/UL
IMM GRANULOCYTES NFR BLD: 0 %
INTERPRETATION ECG - MUSE: NORMAL
LYMPHOCYTES # BLD AUTO: 2.3 10E3/UL (ref 0.8–5.3)
LYMPHOCYTES NFR BLD AUTO: 25 %
MCH RBC QN AUTO: 28.8 PG (ref 26.5–33)
MCHC RBC AUTO-ENTMCNC: 33.7 G/DL (ref 31.5–36.5)
MCV RBC AUTO: 85 FL (ref 78–100)
MONOCYTES # BLD AUTO: 0.6 10E3/UL (ref 0–1.3)
MONOCYTES NFR BLD AUTO: 7 %
NEUTROPHILS # BLD AUTO: 6.1 10E3/UL (ref 1.6–8.3)
NEUTROPHILS NFR BLD AUTO: 67 %
NRBC # BLD AUTO: 0 10E3/UL
NRBC BLD AUTO-RTO: 0 /100
P AXIS - MUSE: 74 DEGREES
PLATELET # BLD AUTO: 306 10E3/UL (ref 150–450)
POTASSIUM BLD-SCNC: 3.7 MMOL/L (ref 3.4–5.3)
PR INTERVAL - MUSE: 112 MS
PROT SERPL-MCNC: 8.3 G/DL (ref 6.8–8.8)
QRS DURATION - MUSE: 92 MS
QT - MUSE: 342 MS
QTC - MUSE: 420 MS
R AXIS - MUSE: 76 DEGREES
RBC # BLD AUTO: 5.77 10E6/UL (ref 4.4–5.9)
SARS-COV-2 RNA RESP QL NAA+PROBE: NEGATIVE
SODIUM SERPL-SCNC: 137 MMOL/L (ref 133–144)
SYSTOLIC BLOOD PRESSURE - MUSE: NORMAL MMHG
T AXIS - MUSE: 64 DEGREES
TROPONIN I SERPL-MCNC: <0.015 UG/L (ref 0–0.04)
VENTRICULAR RATE- MUSE: 91 BPM
WBC # BLD AUTO: 9.1 10E3/UL (ref 4–11)

## 2021-11-17 PROCEDURE — 80053 COMPREHEN METABOLIC PANEL: CPT | Performed by: EMERGENCY MEDICINE

## 2021-11-17 PROCEDURE — 85025 COMPLETE CBC W/AUTO DIFF WBC: CPT | Performed by: EMERGENCY MEDICINE

## 2021-11-17 PROCEDURE — C9803 HOPD COVID-19 SPEC COLLECT: HCPCS

## 2021-11-17 PROCEDURE — 36415 COLL VENOUS BLD VENIPUNCTURE: CPT | Performed by: EMERGENCY MEDICINE

## 2021-11-17 PROCEDURE — 85379 FIBRIN DEGRADATION QUANT: CPT | Performed by: EMERGENCY MEDICINE

## 2021-11-17 PROCEDURE — 84484 ASSAY OF TROPONIN QUANT: CPT | Performed by: EMERGENCY MEDICINE

## 2021-11-17 PROCEDURE — 99285 EMERGENCY DEPT VISIT HI MDM: CPT

## 2021-11-17 PROCEDURE — 93005 ELECTROCARDIOGRAM TRACING: CPT

## 2021-11-17 PROCEDURE — 71045 X-RAY EXAM CHEST 1 VIEW: CPT

## 2021-11-17 PROCEDURE — 87636 SARSCOV2 & INF A&B AMP PRB: CPT | Performed by: EMERGENCY MEDICINE

## 2021-11-17 ASSESSMENT — MIFFLIN-ST. JEOR: SCORE: 1812.15

## 2021-11-17 ASSESSMENT — ENCOUNTER SYMPTOMS
PALPITATIONS: 1
BLOOD IN STOOL: 0
COUGH: 0
SHORTNESS OF BREATH: 0

## 2021-11-17 NOTE — ED TRIAGE NOTES
"Patient reports high heart rate today. Denies CP or SOB. Complains of GI discomfort. \"I can feel the heart beating in my chest.\" Was seen yesterday for the same thing. Denies CP.  "

## 2021-11-17 NOTE — TELEPHONE ENCOUNTER
"ED/Discharge Protocol    \"Hi, my name is Cecily Hurst RN, a registered nurse, and I am calling on behalf of Dr. Parsons's office at [place].  I am calling to follow up and see how things are going for you after your recent visit.\"    \"I see that you were in the (ER/UC/IP) on 11/16/21.    How are you doing now that you are home?\" better - has not happened today      Discharge Instructions    \"Let's review your discharge instructions.  What is/are the follow-up recommendations?  Pt. Response: follow up with cardiology for Holter placement    \"Were you instructed to make a follow-up appointment?\"  Pt. Response: Yes.  Waiting to hear from cardiology     \"When you see the provider, I would recommend that you bring your discharge instructions with you.    Medications    \"How many new medications are you on since your hospitalization/ED visit?\"    none  \"How many of your current medicines changed (dose, timing, name, etc.) while you were in the hospital/ED visit?\"   none  Post Discharge Medication Reconciliation Status: reconciled without change    Call Summary    \"Do you have any questions or concerns about your condition or care plan at the moment?\"    No - has number for cardiology if he doesn't hear from them    \"If you have questions or things don't continue to improve, we encourage you contact us through the main clinic number,  [phone].  Even if the clinic is not open, triage nurses are available 24/7 to help you.     Cecily Hurst RN  [facility]        "

## 2021-11-17 NOTE — ED PROVIDER NOTES
History     Chief Complaint:  Dizziness       HPI   Lazaro Ingram is a 36 year old male who presents with concern of palpitations. For the past couple months the patient has had infrequent episodes of what he describes as elevated heart rate/palpitations and associated lightheadedness. This has been infrequent. Today he has felt it again and became concerned so visited the emergency department for assessment. Currently he feels asymptomatic. He has a apple watch which will record his pulse rate so he noted his rate would drive up to 130 during these episodes. Denies associated chest pain. No dyspnea. No cough. He has not fainted. No nausea. No abdominal pain. He does not take any daily medications. Denies excessive caffeine use or use of recent or cold and flu medications specifically pseudoephedrine-based. He vapes nicotine but this is a longstanding habit. He denies vaping more lately. Denies alcohol abuse. Denies other substance abuse. He admits that he recently has been stressed out with work and his personal life. Recently traveled to [place] by air, short flight. No history of thrombus. No known family history of arrhythmia or coronary artery disease. No other complaints.    ROS:  Review of Systems   All systems otherwise negative or per HPI    Allergies:  No Known Allergies     Medications:    triamcinolone (KENALOG) 0.1 % external cream        Past Medical History:    No past medical history on file.  There is no problem list on file for this patient.       Past Surgical History:    No past surgical history on file.     Family History:    family history includes Substance Abuse in his mother.    Social History:   reports that he has quit smoking. He smoked 0.50 packs per day. He uses smokeless tobacco. He reports current alcohol use. He reports current drug use. Drug: Marijuana.  PCP: Johnny Parsons     Physical Exam     Patient Vitals for the past 24 hrs:   BP Temp Pulse Resp SpO2   11/16/21 2132  123/78 -- 58 21 97 %   11/16/21 1829 (!) 144/79 97  F (36.1  C) 93 14 98 %      Physical Exam  SKIN:  Warm, dry.  HEMATOLOGIC/IMMUNOLOGIC/LYMPHATIC:  No pallor.  No extremity edema.  HENT: No thyromegaly.  No jugular venous distention.  EYES:  Conjunctivae normal.  CARDIOVASCULAR:  Regular rate and rhythm.  No murmur.  No rub.  RESPIRATORY:  No respiratory distress, breath sounds equal and normal.  GASTROINTESTINAL:  Soft, nontender abdomen.  MUSCULOSKELETAL: Normal body habitus.  NEUROLOGIC:  Alert, conversant.  PSYCHIATRIC:  Normal mood.    Emergency Department Course   ECG  ECG taken at 1905, ECG read at 2116  Sinus rhythm with short IL. Possible left atrial enlargement. Borderline ECG.   Rate 69 bpm. IL interval 108 ms. QRS duration 96 ms. QT/QTc 370/396 ms. P-R-T axes 60 73 69.     Imaging:  Holter Monitor 48 hour Adult Pediatric    (Results Pending)      Laboratory:  Labs Ordered and Resulted from Time of ED Arrival to Time of ED Departure   BASIC METABOLIC PANEL - Abnormal       Result Value    Sodium 137      Potassium 3.9      Chloride 103      Carbon Dioxide (CO2) 30      Anion Gap 4      Urea Nitrogen 13      Creatinine 1.01      Calcium 9.0      Glucose 100 (*)     GFR Estimate >90     TROPONIN I - Normal    Troponin I <0.015     TSH WITH FREE T4 REFLEX - Normal    TSH 0.59     CBC WITH PLATELETS AND DIFFERENTIAL    WBC Count 8.4      RBC Count 5.62      Hemoglobin 16.1      Hematocrit 48.3      MCV 86      MCH 28.6      MCHC 33.3      RDW 12.6      Platelet Count 286      % Neutrophils 61      % Lymphocytes 29      % Monocytes 7      % Eosinophils 1      % Basophils 1      % Immature Granulocytes 1      NRBCs per 100 WBC 0      Absolute Neutrophils 5.2      Absolute Lymphocytes 2.4      Absolute Monocytes 0.6      Absolute Eosinophils 0.1      Absolute Basophils 0.1      Absolute Immature Granulocytes 0.0      Absolute NRBCs 0.0          Procedures   None    Emergency Department Course:  Reviewed:  I  reviewed nursing notes, vitals and past medical history    Assessments:   I obtained history and examined the patient as noted above.    I rechecked the patient and explained findings.        Consults:   none    Interventions:  Medications   sodium chloride (PF) 0.9% PF flush 3 mL (has no administration in time range)   sodium chloride (PF) 0.9% PF flush 3 mL (has no administration in time range)        Disposition:  The patient was discharged to home.     Impression & Plan    CMS Diagnoses: None     Medical Decision Making:  This patient presents with episodic palpitations with associated lightheadedness.  He has documented these episodes via his apple watch.  This has revealed elevated heart rate but of course does not provide a distinct rhythm.  Testing performed as above with respect to hematologic or metabolic derangement.  Considered thyroid disease.  Gladly testing was all negative.  There is no captured arrhythmia during his monitoring in the emergency department.  He certainly may be suffering episodic arrhythmia so I did place an order for a Holter monitor which may capture an episode and provide a diagnosis.  The patient does admit that he has been anxious with several life changes as of late.  That of course may be causing his symptoms.  I thought pulmonary embolism quite unlikely given he is PERC negative.  Follow-up advised but otherwise advised to return to the emergency department if recurrent sustained arrhythmia with associated symptoms.    Diagnosis:    ICD-10-CM    1. Palpitations  R00.2 Holter Monitor 48 hour Adult Pediatric        Discharge Medications:  Discharge Medication List as of 11/16/2021 10:08 PM           11/16/2021   Herb Bryant MD Moe, James Thomas, MD  11/17/21 0012

## 2021-11-18 ENCOUNTER — PATIENT OUTREACH (OUTPATIENT)
Dept: FAMILY MEDICINE | Facility: CLINIC | Age: 36
End: 2021-11-18
Payer: COMMERCIAL

## 2021-11-18 NOTE — TELEPHONE ENCOUNTER
"ED/Discharge Protocol    \"Hi, my name is Kym Stephenson RN, a registered nurse, and I am calling on behalf of Johnny Parsons's office at [place].  I am calling to follow up and see how things are going for you after your recent visit.\"    \"I see that you were in the (ER/UC/IP) on yesterday.    How are you doing now that you are home?\" \"doing ok\"    Is patient experiencing symptoms that may require a hospital visit?  no    Discharge Instructions    \"Let's review your discharge instructions.  What is/are the follow-up recommendations?  Pt. Response: pt told someone would reach out to him about the holter monitor but hasn't heard back from them, the monitor was not provided to pt. He does not  have phone number to cardiology    \"Were you instructed to make a follow-up appointment?\"  Pt. Response: No. Get monitor           Medications    \"How many new medications are you on since your hospitalization/ED visit?\"    0-1  \"How many of your current medicines changed (dose, timing, name, etc.) while you were in the hospital/ED visit?\"   0-1  \"Do you have questions about your medications?\"   No  \"Were you newly diagnosed with heart failure, COPD, diabetes or did you have a heart attack?\"     Post Discharge Medication Reconciliation Status: discharge medications reconciled and changed, per note/orders.    Was MTM referral placed (*Make sure to put transitions as reason for referral)?   No    Call Summary    \"Do you have any questions or concerns about your condition or care plan at the moment?\"    No  Triage nurse advice given: call to schedule the holter monitor at [phone]    Patient was in ER 2 in the past year (assess appropriateness of ER visits.)      \"If you have questions or things don't continue to improve, we encourage you contact us through the main clinic number,   Kym Stephenson RN, BSN  [facility]       "

## 2021-11-22 ENCOUNTER — OFFICE VISIT (OUTPATIENT)
Dept: FAMILY MEDICINE | Facility: CLINIC | Age: 36
End: 2021-11-22

## 2021-11-22 VITALS
DIASTOLIC BLOOD PRESSURE: 80 MMHG | OXYGEN SATURATION: 99 % | BODY MASS INDEX: 25.36 KG/M2 | WEIGHT: 187 LBS | HEART RATE: 69 BPM | TEMPERATURE: 99.1 F | SYSTOLIC BLOOD PRESSURE: 121 MMHG

## 2021-11-22 DIAGNOSIS — Z23 ENCOUNTER FOR IMMUNIZATION: ICD-10-CM

## 2021-11-22 DIAGNOSIS — F41.9 ANXIETY: ICD-10-CM

## 2021-11-22 DIAGNOSIS — Z71.3 NUTRITIONAL COUNSELING: ICD-10-CM

## 2021-11-22 DIAGNOSIS — Z23 NEED FOR PROPHYLACTIC VACCINATION AND INOCULATION AGAINST INFLUENZA: ICD-10-CM

## 2021-11-22 DIAGNOSIS — R00.2 PALPITATIONS: Primary | ICD-10-CM

## 2021-11-22 PROCEDURE — 0064A COVID-19,PF,MODERNA (18+ YRS BOOSTER .25ML): CPT | Performed by: PHYSICIAN ASSISTANT

## 2021-11-22 PROCEDURE — 99213 OFFICE O/P EST LOW 20 MIN: CPT | Mod: 25 | Performed by: PHYSICIAN ASSISTANT

## 2021-11-22 PROCEDURE — 90471 IMMUNIZATION ADMIN: CPT | Performed by: PHYSICIAN ASSISTANT

## 2021-11-22 PROCEDURE — 91306 COVID-19,PF,MODERNA (18+ YRS BOOSTER .25ML): CPT | Performed by: PHYSICIAN ASSISTANT

## 2021-11-22 PROCEDURE — 90686 IIV4 VACC NO PRSV 0.5 ML IM: CPT | Performed by: PHYSICIAN ASSISTANT

## 2021-11-22 NOTE — PROGRESS NOTES
Assessment & Plan     Palpitations  Anxiety  Nutritional counseling  Recent ED visit for palpitations with reassuring workup. Has noticed relationship between palpitations and being constipated. 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 A lot of emotions with this move but overall a good choice. Will be less stress. Has order in for holter monitor if palpitations persist. Continue to work on diet/exercise changes. Interested in nutrition counseling. Return to care if new or worsening symptoms.   - Nutrition Referral; Future    Encounter for immunization  - COVID-19,PF,MODERNA (18+ YRS BOOSTER .25ML)    Need for prophylactic vaccination and inoculation against influenza  - INFLUENZA VACCINE IM >6 MO VALENT IIV4 (ALFURIA/FLUZONE)       BMI:   Estimated body mass index is 25.36 kg/m  as calculated from the following:    Height as of 11/17/21: 1.829 m (6').    Weight as of this encounter: 84.8 kg (187 lb).   Weight management plan: Discussed healthy diet and exercise guidelines    Return in about 6 months (around 5/22/2022) for Routine Visit, Lab Work.    Johnny Parsons PA-C  [facility]    Minda Willis is a 36 year old who presents for the following health issues     History of Present Illness       Vascular Disease:  He presents for follow up of vascular disease.  He never takes nitroglycerin. He is not taking daily aspirin.    He eats 0-1 servings of fruits and vegetables daily.He consumes 1 sweetened beverage(s) daily.He exercises with enough effort to increase his heart rate 20 to 29 minutes per day.  He exercises with enough effort to increase his heart rate 3 or less days per week.   He is taking medications regularly.     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 Does not think it was stress related. Had been feeling good mentally -- made decision to leave job and was enjoying the final countdown. Previously saw Nedraana RUT for counseling but reports has not felt a need recently.   Palpitations - recent ED visits 11/16 and 11/17 for palpitations. Testing done in the ED was unremarkable. Lazaro is wondering if related to digestive issues. Looking back he notes he was constipated during those episodes. Took some laxative at home and has cleared things out. Since that time fine. No chest pain, shortness of breath. Only symptoms was tachycardia and palpitations. Both times started about 30 minutes after eating. HR in 130s while seated. His watch was sending him notifications. Drinks alcohol maybe one drink per week. Does vape - cut out for a couple days and has restarted. Prior to bowel cleanout was straining.     Family history:  Mother - living - healthy - failed cataract surgery otherwise OK   Father - living - healthy   Three older sisters - living - healthy       Review of Systems   Constitutional, HEENT, cardiovascular, pulmonary, gi and gu systems are negative, except as otherwise noted.      Objective    /80 (BP Location: Right arm, Patient Position: Sitting, Cuff Size: Adult Regular)   Pulse 69   Temp 99.1  F (37.3  C) (Temporal)   Wt 84.8 kg (187 lb)   SpO2 99%   BMI 25.36 kg/m    Body mass index is 25.36 kg/m .  Physical Exam  Vitals and nursing note reviewed.   Constitutional:       Appearance: Normal appearance.   HENT:      Head: Normocephalic and atraumatic.      Mouth/Throat:      Mouth: Mucous membranes are moist.      Pharynx: No posterior oropharyngeal erythema.   Eyes:      Conjunctiva/sclera: Conjunctivae normal.   Cardiovascular:      Rate and Rhythm: Normal rate and regular rhythm.      Heart sounds: Normal heart sounds.   Pulmonary:      Effort: Pulmonary effort is normal.      Breath sounds: Normal breath sounds.    Abdominal:      General: Bowel sounds are normal.      Palpations: Abdomen is soft.      Tenderness: There is no abdominal tenderness.   Musculoskeletal:      Cervical back: Neck supple.   Skin:     General: Skin is warm and dry.   Neurological:      General: No focal deficit present.      Mental Status: He is alert. Mental status is at baseline.   Psychiatric:         Mood and Affect: Mood normal.         Behavior: Behavior normal.        No results found for this or any previous visit (from the past 24 hour(s)).